# Patient Record
Sex: MALE | Race: WHITE | NOT HISPANIC OR LATINO | Employment: OTHER | URBAN - METROPOLITAN AREA
[De-identification: names, ages, dates, MRNs, and addresses within clinical notes are randomized per-mention and may not be internally consistent; named-entity substitution may affect disease eponyms.]

---

## 2017-03-07 ENCOUNTER — OFFICE VISIT (OUTPATIENT)
Dept: OPHTHALMOLOGY | Facility: CLINIC | Age: 77
End: 2017-03-07
Payer: MEDICARE

## 2017-03-07 DIAGNOSIS — D31.32 NEVUS, CHOROIDAL, LEFT: ICD-10-CM

## 2017-03-07 DIAGNOSIS — H43.813 POSTERIOR VITREOUS DETACHMENT, BILATERAL: ICD-10-CM

## 2017-03-07 DIAGNOSIS — H02.403 PTOSIS, BILATERAL: ICD-10-CM

## 2017-03-07 DIAGNOSIS — H35.363 MACULAR DRUSEN, BILATERAL: ICD-10-CM

## 2017-03-07 DIAGNOSIS — H52.4 PRESBYOPIA: ICD-10-CM

## 2017-03-07 DIAGNOSIS — H26.9 CATARACT: Primary | ICD-10-CM

## 2017-03-07 PROCEDURE — 92014 COMPRE OPH EXAM EST PT 1/>: CPT | Performed by: OPHTHALMOLOGY

## 2017-03-07 PROCEDURE — 92015 DETERMINE REFRACTIVE STATE: CPT | Mod: GY | Performed by: OPHTHALMOLOGY

## 2017-03-07 ASSESSMENT — REFRACTION_WEARINGRX
OD_ADD: +3.00
OD_CYLINDER: +2.00
OD_SPHERE: +0.50
OS_CYLINDER: +1.50
OS_SPHERE: +0.50
OS_ADD: +3.00
OS_AXIS: 010
SPECS_TYPE: TRIFOCAL
OD_AXIS: 160

## 2017-03-07 ASSESSMENT — REFRACTION_MANIFEST
OD_SPHERE: +0.25
OS_CYLINDER: +1.75
OS_ADD: +3.00
OS_SPHERE: +0.25
OD_AXIS: 160
OD_ADD: +3.00
OD_CYLINDER: +2.00
OS_AXIS: 015

## 2017-03-07 ASSESSMENT — CONF VISUAL FIELD
OD_NORMAL: 1
OS_NORMAL: 1

## 2017-03-07 ASSESSMENT — CUP TO DISC RATIO
OS_RATIO: 0.2
OD_RATIO: 0.2

## 2017-03-07 ASSESSMENT — TONOMETRY
OD_IOP_MMHG: 15
IOP_METHOD: TONOPEN
OS_IOP_MMHG: 15

## 2017-03-07 ASSESSMENT — VISUAL ACUITY
OD_CC: 20/30+3
OD_CC: J1
OS_CC: 20/25-2
METHOD: SNELLEN - LINEAR
OS_CC: J1

## 2017-03-07 ASSESSMENT — EXTERNAL EXAM - RIGHT EYE: OD_EXAM: PROLAPSED FAT PADS: UPPER, LOWER

## 2017-03-07 ASSESSMENT — SLIT LAMP EXAM - LIDS: COMMENTS: 1+ DERMATOCHALASIS - UPPER LID, 1+ SCLERAL SHOW, 2+ PTOSIS

## 2017-03-07 ASSESSMENT — EXTERNAL EXAM - LEFT EYE: OS_EXAM: PROLAPSED FAT PADS: UPPER, LOWER

## 2017-03-07 NOTE — MR AVS SNAPSHOT
"              After Visit Summary   3/7/2017    Duane A Otte    MRN: [id number]           Patient Information     Date Of Birth          1940        Visit Information        Provider Department      3/7/2017 10:00 AM Zhen Barreto MD [facility]        Today's Diagnoses     Macular drusen, bilateral    -  1    Posterior vitreous detachment, bilateral        Nevus, choroidal, left        Cataract, mild, ou        Ptosis, mild, ou        Presbyopia          Care Instructions    Take a multiple vitamin or \"eye vitamin\" daily. (AREDS 2)  Protect your eyes outdoors from ultraviolet rays with sunglasses and/or brimmed hat.  Have spinach (cooked or raw), colorful fruits, walnuts, hazelnuts, almonds in your diet.  Monitor the vision in each eye weekly - call if any sudden persistent changes.   Glasses Rx given -optional   Use artificial tears up to 4 times daily (Refresh Tears or Systane Ultra/Balance)   Call in November 2017 for an appointment in March 2018 for Complete exam.    Dr. Barreto [phone]        Follow-ups after your visit        Who to contact     If you have questions or need follow up information about today's clinic visit or your schedule please contact [facility] directly at [phone].  Normal or non-critical lab and imaging results will be communicated to you by iFulfillmenthart, letter or phone within 4 business days after the clinic has received the results. If you do not hear from us within 7 days, please contact the clinic through iFulfillmenthart or phone. If you have a critical or abnormal lab result, we will notify you by phone as soon as possible.  Submit refill requests through Waddle or call your pharmacy and they will forward the refill request to us. Please allow 3 business days for your refill to be completed.          Additional Information About Your Visit        Waddle Information     Waddle lets you send messages to your doctor, view your test results, renew " "your prescriptions, schedule appointments and more. To sign up, go to [email or web address] . Click on \"Log in\" on the left side of the screen, which will take you to the Welcome page. Then click on \"Sign up Now\" on the right side of the page.     You will be asked to enter the access code listed below, as well as some personal information. Please follow the directions to create your username and password.     Your access code is: [email or web address]  Expires: 2017 10:58 AM     Your access code will  in 90 days. If you need help or a new code, please call your [facility] clinic or [phone].        Care EveryWhere ID     This is your Care EveryWhere ID. This could be used by other organizations to access your [facility] medical records  [id number]         Blood Pressure from Last 3 Encounters:   13 127/81   12 135/84   12 114/80    Weight from Last 3 Encounters:   13 76.7 kg (169 lb)   12 75.8 kg (167 lb)   12 77 kg (169 lb 12.8 oz)              We Performed the Following     EYE EXAM (SIMPLE-NONBILLABLE)     REFRACTIVE STATUS        Primary Care Provider Office Phone # Fax #    Benjamin Ruiz [phone]       [facility] [address] [place] [address]        Thank you!     Thank you for choosing [facility]  for your care. Our goal is always to provide you with excellent care. Hearing back from our patients is one way we can continue to improve our services. Please take a few minutes to complete the written survey that you may receive in the mail after your visit with us. Thank you!             Your Updated Medication List - Protect others around you: Learn how to safely use, store and throw away your medicines at www.disposemymeds.org.          This list is accurate as of: 3/7/17 10:58 AM.  Always use your most recent med list.                   Brand Name Dispense Instructions for use    alendronate 70 MG tablet    " FOSAMAX    12 tablet    Take 1 tablet by mouth once a week.       CALCIUM 600 + D PO      2 daily       celeXA 10 MG tablet   Generic drug:  citalopram      Take 10 mg by mouth daily.       LAXATIVE PO      6 daily       LISINOPRIL PO          MORPHINE SULFATE PO      Take 30 mg by mouth 2 times daily       MULTIPLE VITAMIN PO      1 daily       NORCO  MG per tablet      Take 1 tablet by mouth. 6 tablets a day.       OMEGA 3 PO      daily       order for DME     1 Container        sildenafil 100 MG cap/tab    REVATIO/VIAGRA    6 tablet    Take 1 tablet by mouth daily as needed for erectile dysfunction.       STOOL SOFTENER 100 MG capsule   Generic drug:  docusate sodium      Take 8 capsules by mouth daily.       TEGRETOL PO      100mg 1 three times a day       triamcinolone 0.1 % cream    KENALOG    30 g    Apply sparingly -twice daily-topically ,as needed.       VITAMIN B 12 PO      1000 mg daily       VITAMIN B COMPLEX PO      1 daily       VITAMIN C PO      1 daily       vitamin D 2000 UNITS Caps      Take 1 capsule by mouth daily.       zolpidem 10 MG tablet    AMBIEN    30 tablet    Take 1 tablet by mouth nightly as needed for sleep.

## 2017-03-07 NOTE — PROGRESS NOTES
" Current Eye Medications:  Tears prn     Subjective:  Comprehensive eye exam.   Patient reports is seeing well, vision seems unchanged and states eyes seem otherwise fine.     Objective:  See Ophthalmology Exam.       Assessment:  Stable eye exam.      ICD-10-CM    1. Cataract, mild, ou H26.9 EYE EXAM (SIMPLE-NONBILLABLE)   2. Macular drusen, bilateral H35.363    3. Posterior vitreous detachment, bilateral H43.813    4. Nevus, choroidal, left D31.32    5. Ptosis, mild, ou H02.403    6. Presbyopia H52.4 REFRACTIVE STATUS        Plan: Take a multiple vitamin or \"eye vitamin\" daily. (AREDS 2)  Protect your eyes outdoors from ultraviolet rays with sunglasses and/or brimmed hat.  Have spinach (cooked or raw), colorful fruits, walnuts, hazelnuts, almonds in your diet.  Monitor the vision in each eye weekly - call if any sudden persistent changes.   Glasses Rx given -optional   Use artificial tears up to 4 times daily (Refresh Tears or Systane Ultra/Balance)   Call in November 2017 for an appointment in March 2018 for Complete exam.    Dr. Barreto [phone]           "

## 2017-03-07 NOTE — PATIENT INSTRUCTIONS
"Take a multiple vitamin or \"eye vitamin\" daily. (AREDS 2)  Protect your eyes outdoors from ultraviolet rays with sunglasses and/or brimmed hat.  Have spinach (cooked or raw), colorful fruits, walnuts, hazelnuts, almonds in your diet.  Monitor the vision in each eye weekly - call if any sudden persistent changes.   Glasses Rx given -optional   Use artificial tears up to 4 times daily (Refresh Tears or Systane Ultra/Balance)   Call in November 2017 for an appointment in March 2018 for Complete exam.    Dr. Barreto [phone]  "

## 2017-10-08 ENCOUNTER — HEALTH MAINTENANCE LETTER (OUTPATIENT)
Age: 77
End: 2017-10-08

## 2018-04-10 ENCOUNTER — OFFICE VISIT (OUTPATIENT)
Dept: OPHTHALMOLOGY | Facility: CLINIC | Age: 78
End: 2018-04-10
Payer: MEDICARE

## 2018-04-10 DIAGNOSIS — D31.32 NEVUS, CHOROIDAL, LEFT: ICD-10-CM

## 2018-04-10 DIAGNOSIS — H43.813 POSTERIOR VITREOUS DETACHMENT, BILATERAL: ICD-10-CM

## 2018-04-10 DIAGNOSIS — H52.4 PRESBYOPIA: ICD-10-CM

## 2018-04-10 DIAGNOSIS — H35.363 MACULAR DRUSEN, BILATERAL: ICD-10-CM

## 2018-04-10 DIAGNOSIS — H02.403 PTOSIS, BILATERAL: ICD-10-CM

## 2018-04-10 DIAGNOSIS — H25.813 COMBINED FORMS OF AGE-RELATED CATARACT OF BOTH EYES: Primary | ICD-10-CM

## 2018-04-10 PROCEDURE — 92014 COMPRE OPH EXAM EST PT 1/>: CPT | Performed by: OPHTHALMOLOGY

## 2018-04-10 PROCEDURE — 92015 DETERMINE REFRACTIVE STATE: CPT | Mod: GY | Performed by: OPHTHALMOLOGY

## 2018-04-10 ASSESSMENT — EXTERNAL EXAM - LEFT EYE: OS_EXAM: PROLAPSED FAT PADS: UPPER, LOWER

## 2018-04-10 ASSESSMENT — REFRACTION_MANIFEST
OD_SPHERE: +0.25
OS_SPHERE: +0.50
OD_AXIS: 162
OS_AXIS: 010
OD_CYLINDER: +2.00
OS_ADD: +3.00
OS_CYLINDER: +1.75
OD_ADD: +3.00

## 2018-04-10 ASSESSMENT — VISUAL ACUITY
OS_CC: 20/20
OD_CC: 20/25
OD_CC+: -2
CORRECTION_TYPE: GLASSES
METHOD: SNELLEN - LINEAR
OS_CC+: -3

## 2018-04-10 ASSESSMENT — REFRACTION_WEARINGRX
OD_CYLINDER: +1.75
SPECS_TYPE: TRIFOCAL
OS_AXIS: 010
OD_ADD: +3.00
OD_SPHERE: +0.50
OS_CYLINDER: +1.50
OS_SPHERE: +0.50
OD_AXIS: 160
OS_ADD: +3.00

## 2018-04-10 ASSESSMENT — CUP TO DISC RATIO
OS_RATIO: 0.2
OD_RATIO: 0.2

## 2018-04-10 ASSESSMENT — TONOMETRY
IOP_METHOD: APPLANATION
OS_IOP_MMHG: 16
OD_IOP_MMHG: 16

## 2018-04-10 ASSESSMENT — SLIT LAMP EXAM - LIDS: COMMENTS: 1+ DERMATOCHALASIS - UPPER LID, 1+ SCLERAL SHOW, 2+ PTOSIS

## 2018-04-10 ASSESSMENT — CONF VISUAL FIELD
OS_NORMAL: 1
OD_NORMAL: 1

## 2018-04-10 ASSESSMENT — EXTERNAL EXAM - RIGHT EYE: OD_EXAM: PROLAPSED FAT PADS: UPPER, LOWER

## 2018-04-10 NOTE — PATIENT INSTRUCTIONS
"Glasses Rx given - optional.  Use artificial tears up to 4 times daily both eyes. (Refresh Tears, Systane Ultra/Balance, or Theratears)   Take a multiple vitamin or \"eye vitamin\" daily. (AREDS 2)  Protect your eyes outdoors from ultraviolet rays with sunglasses and/or brimmed hat.  Have spinach (cooked or raw), colorful fruits, walnuts, hazelnuts, almonds in your diet.  Monitor the vision in each eye weekly - call if any sudden persistent changes.   Use Amsler grid as instructed.    Call in December 2018 for an appointment in April 2019 for Complete Exam.    Dr. Barreto [phone]  "

## 2018-04-10 NOTE — PROGRESS NOTES
" Current Eye Medications:  Artificial tears as needed both eyes     Subjective:  Here for complete today.  Taking the AREDS every day.  Eyesight has been steady. Does not have amsler at home.      Objective:  See Ophthalmology Exam.       Assessment:  Stable eye exam.      ICD-10-CM    1. 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 Nevus, choroidal, left D31.32    3. Macular drusen, bilateral H35.363    4. Ptosis, mild, ou H02.403    5. Posterior vitreous detachment, bilateral H43.813    6. Presbyopia H52.4 REFRACTIVE STATUS        Plan:  Glasses Rx given - optional.  Use artificial tears up to 4 times daily both eyes. (Refresh Tears, Systane Ultra/Balance, or Theratears)   Take a multiple vitamin or \"eye vitamin\" daily. (AREDS 2)  Protect your eyes outdoors from ultraviolet rays with sunglasses and/or brimmed hat.  Have spinach (cooked or raw), colorful fruits, walnuts, hazelnuts, almonds in your diet.  Monitor the vision in each eye weekly - call if any sudden persistent changes.   Use Amsler grid as instructed.    Call in December 2018 for an appointment in April 2019 for Complete Exam.    Dr. Barreto [phone]    "

## 2018-04-10 NOTE — LETTER
"    4/10/2018         RE: Duane A Otte  [address]  [facility] [address]        Dear Colleague,    Thank you for referring your patient, Duane A Otte, to the [facility]. Please see a copy of my visit note below.     Current Eye Medications:  Artificial tears as needed both eyes     Subjective:  Here for complete today.  Taking the AREDS every day.  Eyesight has been steady. Does not have amsler at home.      Objective:  See Ophthalmology Exam.       Assessment:  Stable eye exam.      ICD-10-CM    1. 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 Nevus, choroidal, left D31.32    3. Macular drusen, bilateral H35.363    4. Ptosis, mild, ou H02.403    5. Posterior vitreous detachment, bilateral H43.813    6. Presbyopia H52.4 REFRACTIVE STATUS        Plan:  Glasses Rx given - optional.  Use artificial tears up to 4 times daily both eyes. (Refresh Tears, Systane Ultra/Balance, or Theratears)   Take a multiple vitamin or \"eye vitamin\" daily. (AREDS 2)  Protect your eyes outdoors from ultraviolet rays with sunglasses and/or brimmed hat.  Have spinach (cooked or raw), colorful fruits, walnuts, hazelnuts, almonds in your diet.  Monitor the vision in each eye weekly - call if any sudden persistent changes.   Use Amsler grid as instructed.    Call in December 2018 for an appointment in April 2019 for Complete Exam.    Dr. Barreto [phone]      Again, thank you for allowing me to participate in the care of your patient.        Sincerely,        Zhen Barreto MD    "

## 2018-04-10 NOTE — MR AVS SNAPSHOT
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  Use artificial tears up to 4 times daily both eyes. (Refresh Tears, Systane Ultra/Balance, or Theratears)   Take a multiple vitamin or \"eye vitamin\" daily. (AREDS 2)  Protect your eyes outdoors from ultraviolet rays with sunglasses and/or brimmed hat.  Have spinach (cooked or raw), colorful fruits, walnuts, hazelnuts, almonds in your diet.  Monitor the vision in each eye weekly - call if any sudden persistent changes.   Use Amsler grid as instructed.    Call in December 2018 for an appointment in April 2019 for Complete Exam.    Dr. Barreto [phone]          Follow-ups after your visit        Who to contact     If you have questions or need follow up information about today's clinic visit or your schedule please contact [facility] directly at [phone].  Normal or non-critical lab and imaging results will be communicated to you by MyChart, letter or phone within 4 business days after the clinic has received the results. If you do not hear from us within 7 days, please contact the clinic through MyChart or phone. If you have a critical or abnormal lab result, we will notify you by phone as soon as possible.  Submit refill requests through Go Try It On or call your pharmacy and they will forward the refill request to us. Please allow 3 business days for your refill to be completed.          Additional Information About Your Visit      " "  MyChart Information     DeliveryCheetah lets you send messages to your doctor, view your test results, renew your prescriptions, schedule appointments and more. To sign up, go to [email or web address] . Click on \"Log in\" on the left side of the screen, which will take you to the Welcome page. Then click on \"Sign up Now\" on the right side of the page.     You will be asked to enter the access code listed below, as well as some personal information. Please follow the directions to create your username and password.     Your access code is: [email or web address]  Expires: 2018  2:23 PM     Your access code will  in 90 days. If you need help or a new code, please call your [facility] clinic or [phone].        Care EveryWhere ID     This is your Care EveryWhere ID. This could be used by other organizations to access your [facility] medical records  [id number]         Blood Pressure from Last 3 Encounters:   13 127/81   12 135/84   12 114/80    Weight from Last 3 Encounters:   13 76.7 kg (169 lb)   12 75.8 kg (167 lb)   12 77 kg (169 lb 12.8 oz)              We Performed the Following     EYE EXAM (SIMPLE-NONBILLABLE)     REFRACTIVE STATUS        Primary Care Provider Office Phone # Fax #    Benjamin Ruiz [phone]       [facility] [place] [address] [place] [address]        Equal Access to Services     GREGORY DEL VALLE AH: Hadii aad ku hadasho Soomaali, waaxda luqadaha, qaybta kaalmada adeegyacatrachito, pavan bose. So [facility] [phone].    ATENCIÓN: Si habla español, tiene a amaral disposición servicios gratuitos de asistencia lingüística. Llame al [phone].    We comply with applicable federal civil rights laws and [place] laws. We do not discriminate on the basis of race, color, national origin, age, disability, sex, sexual orientation, or gender identity.            Thank you!     Thank you for choosing [facility]  for your care. Our goal is always to provide you with excellent care. Hearing back from our patients is one way we can continue to improve our services. Please take a few minutes to complete the written survey that you may receive in the mail after your visit with us. Thank you!             Your Updated Medication List - Protect others around you: Learn how to safely use, store and throw away your medicines at www.disposemymeds.org.          This list is accurate as of 4/10/18  2:23 PM.  Always use your most recent med list.                   Brand Name Dispense Instructions for use Diagnosis    alendronate 70 MG tablet    FOSAMAX    12 tablet    Take 1 tablet by mouth once a week.    Osteopenia       CALCIUM 600 + D PO      2 daily        celeXA 10 MG tablet   Generic drug:  citalopram      Take 10 mg by mouth daily.        ICAPS AREDS 2 PO      Take 1 capsule by mouth 2 times daily        LAXATIVE PO      6 daily        LISINOPRIL PO           MORPHINE SULFATE PO      Take 30 mg by mouth 2 times daily        MULTIPLE VITAMIN PO      1 daily        NORCO  MG per tablet      Take 1 tablet by mouth. 6 tablets a day.        OMEGA 3 PO      daily        order for DME     1 Container     Anal or rectal pain       sildenafil 100 MG tablet    VIAGRA    6 tablet    Take 1 tablet by mouth daily as needed for erectile dysfunction.    ED (erectile dysfunction)       STOOL SOFTENER 100 MG capsule   Generic drug:  docusate sodium      Take 8 capsules by mouth daily.        TEGRETOL PO      100mg 1 three times a day        triamcinolone 0.1 % cream    KENALOG    30 g    Apply sparingly -twice daily-topically ,as needed.    Rash       VITAMIN B 12 PO      1000 mg daily        VITAMIN B COMPLEX PO      1 daily        VITAMIN C PO      1 daily        vitamin D 2000 units Caps      Take 1 capsule by mouth daily.        zolpidem 10 MG tablet    AMBIEN    30 tablet    Take 1 tablet by mouth nightly as needed for sleep.     Insomnia

## 2019-04-16 ENCOUNTER — OFFICE VISIT (OUTPATIENT)
Dept: OPHTHALMOLOGY | Facility: CLINIC | Age: 79
End: 2019-04-16
Payer: MEDICARE

## 2019-04-16 DIAGNOSIS — H02.403 PTOSIS, BILATERAL: ICD-10-CM

## 2019-04-16 DIAGNOSIS — H52.4 PRESBYOPIA: ICD-10-CM

## 2019-04-16 DIAGNOSIS — D31.32 NEVUS, CHOROIDAL, LEFT: ICD-10-CM

## 2019-04-16 DIAGNOSIS — H35.363 MACULAR DRUSEN, BILATERAL: ICD-10-CM

## 2019-04-16 DIAGNOSIS — H43.813 POSTERIOR VITREOUS DETACHMENT, BILATERAL: ICD-10-CM

## 2019-04-16 DIAGNOSIS — H25.813 COMBINED FORMS OF AGE-RELATED CATARACT OF BOTH EYES: Primary | ICD-10-CM

## 2019-04-16 PROCEDURE — 92134 CPTRZ OPH DX IMG PST SGM RTA: CPT | Performed by: OPHTHALMOLOGY

## 2019-04-16 PROCEDURE — 92014 COMPRE OPH EXAM EST PT 1/>: CPT | Performed by: OPHTHALMOLOGY

## 2019-04-16 PROCEDURE — 92015 DETERMINE REFRACTIVE STATE: CPT | Mod: GY | Performed by: OPHTHALMOLOGY

## 2019-04-16 ASSESSMENT — CONF VISUAL FIELD
METHOD: COUNTING FINGERS
OS_NORMAL: 1
OD_NORMAL: 1

## 2019-04-16 ASSESSMENT — REFRACTION_WEARINGRX
OD_CYLINDER: +1.75
OS_AXIS: 010
SPECS_TYPE: TRIFOCAL
OS_SPHERE: +0.50
OD_AXIS: 160
OS_CYLINDER: +1.50
OS_ADD: +3.00
OD_SPHERE: +0.50
OD_ADD: +3.00

## 2019-04-16 ASSESSMENT — REFRACTION_MANIFEST
OS_CYLINDER: +2.25
OD_AXIS: 170
OD_CYLINDER: +1.75
OS_SPHERE: +0.25
OS_ADD: +2.75
OS_AXIS: 010
OD_SPHERE: +0.25
OD_ADD: +2.75

## 2019-04-16 ASSESSMENT — TONOMETRY
OD_IOP_MMHG: 12
IOP_METHOD: APPLANATION
OS_IOP_MMHG: 15

## 2019-04-16 ASSESSMENT — VISUAL ACUITY
METHOD: SNELLEN - LINEAR
OD_CC: 20/40
CORRECTION_TYPE: GLASSES
OS_CC+: -1
OD_CC+: -1
OS_CC: 20/30

## 2019-04-16 ASSESSMENT — EXTERNAL EXAM - RIGHT EYE: OD_EXAM: PROLAPSED FAT PADS: UPPER, LOWER

## 2019-04-16 ASSESSMENT — SLIT LAMP EXAM - LIDS: COMMENTS: 1+ DERMATOCHALASIS - UPPER LID, 1+ SCLERAL SHOW, 2+ PTOSIS

## 2019-04-16 ASSESSMENT — EXTERNAL EXAM - LEFT EYE: OS_EXAM: PROLAPSED FAT PADS: UPPER, LOWER

## 2019-04-16 ASSESSMENT — CUP TO DISC RATIO
OD_RATIO: 0.2
OS_RATIO: 0.2

## 2019-04-16 NOTE — PROGRESS NOTES
" Current Eye Medications:  Artificial tears as needed less then once a week both eyes.     Subjective:  Complete eye exam. Vision is doing pretty well both eyes. No eye pain or discomfort in either eye.      Objective:  See Ophthalmology Exam.       Assessment:  Stable eye exam.  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      ICD-10-CM    1. Combined forms of age-related cataract, mild, both eyes H25.813 EYE EXAM (SIMPLE-NONBILLABLE)   2. Macular drusen, bilateral H35.363 HC COMPUTERIZED OPHTHALMIC IMAGING RETINA   3. Nevus, choroidal, left D31.32    4. Ptosis, mild, ou H02.403    5. Posterior vitreous detachment, bilateral H43.813    6. Presbyopia H52.4 REFRACTION        Plan:  Glasses Rx given - optional.  Use artificial tears up to 4 times daily both eyes.  (Refresh Tears, Systane Ultra/Balance, or Theratears)  Take a multiple vitamin or \"eye vitamin\" daily (AREDS2).  Protect your eyes outdoors from ultraviolet rays with sunglasses and/or brimmed hat.  Have spinach (cooked or raw), colorful fruits, walnuts, hazelnuts, almonds in your diet.  Monitor the vision in each eye weekly - call if any sudden persistent changes.  Obtain baseline OCT today - will call with any concerns.   Call in December 2019 for an appointment in April 2020 for Complete Exam.    Dr. Barreto [phone]           "

## 2019-04-16 NOTE — PATIENT INSTRUCTIONS
"Glasses Rx given - optional.  Use artificial tears up to 4 times daily both eyes.  (Refresh Tears, Systane Ultra/Balance, or Theratears)  Take a multiple vitamin or \"eye vitamin\" daily (AREDS2).  Protect your eyes outdoors from ultraviolet rays with sunglasses and/or brimmed hat.  Have spinach (cooked or raw), colorful fruits, walnuts, hazelnuts, almonds in your diet.  Monitor the vision in each eye weekly - call if any sudden persistent changes.  Obtain baseline OCT today - will call with any concerns.   Call in December 2019 for an appointment in April 2020 for Complete Exam.    Dr. Barreto [phone]    "

## 2019-04-16 NOTE — LETTER
"    4/16/2019         RE: Duane A Otte  [address] [facility] [address]        Dear Colleague,    Thank you for referring your patient, Duane A Otte, to the [facility]. Please see a copy of my visit note below.     Current Eye Medications:  Artificial tears as needed less then once a week both eyes.     Subjective:  Complete eye exam. Vision is doing pretty well both eyes. No eye pain or discomfort in either eye.      Objective:  See Ophthalmology Exam.       Assessment:  Stable eye exam.  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      ICD-10-CM    1. Combined forms of age-related cataract, mild, both eyes H25.813 EYE EXAM (SIMPLE-NONBILLABLE)   2. Macular drusen, bilateral H35.363 HC COMPUTERIZED OPHTHALMIC IMAGING RETINA   3. Nevus, choroidal, left D31.32    4. Ptosis, mild, ou H02.403    5. Posterior vitreous detachment, bilateral H43.813    6. Presbyopia H52.4 REFRACTION        Plan:  Glasses Rx given - optional.  Use artificial tears up to 4 times daily both eyes.  (Refresh Tears, Systane Ultra/Balance, or Theratears)  Take a multiple vitamin or \"eye vitamin\" daily (AREDS2).  Protect your eyes outdoors from ultraviolet rays with sunglasses and/or brimmed hat.  Have spinach (cooked or raw), colorful fruits, walnuts, hazelnuts, almonds in your diet.  Monitor the vision in each eye weekly - call if any sudden persistent changes.  Obtain baseline OCT today - will call with any concerns.   Call in December 2019 for an appointment in April 2020 for Complete Exam.    Dr. Barreto [phone]             Again, thank you for allowing me to participate in the care of your patient.        Sincerely,        Zhen Barreto MD    "

## 2019-09-16 ENCOUNTER — APPOINTMENT (OUTPATIENT)
Age: 79
Setting detail: DERMATOLOGY
End: 2019-09-18

## 2019-09-16 VITALS — HEIGHT: 70 IN | RESPIRATION RATE: 14 BRPM | WEIGHT: 172 LBS

## 2019-09-16 DIAGNOSIS — L57.0 ACTINIC KERATOSIS: ICD-10-CM

## 2019-09-16 DIAGNOSIS — L82.1 OTHER SEBORRHEIC KERATOSIS: ICD-10-CM

## 2019-09-16 PROCEDURE — OTHER PRESCRIPTION SAMPLES PROVIDED: OTHER

## 2019-09-16 PROCEDURE — OTHER LIQUID NITROGEN: OTHER

## 2019-09-16 PROCEDURE — 17000 DESTRUCT PREMALG LESION: CPT

## 2019-09-16 PROCEDURE — 17003 DESTRUCT PREMALG LES 2-14: CPT

## 2019-09-16 PROCEDURE — OTHER COUNSELING: OTHER

## 2019-09-16 PROCEDURE — 99202 OFFICE O/P NEW SF 15 MIN: CPT | Mod: 25

## 2019-09-16 ASSESSMENT — LOCATION ZONE DERM
LOCATION ZONE: EAR
LOCATION ZONE: ARM
LOCATION ZONE: TRUNK
LOCATION ZONE: FACE

## 2019-09-16 ASSESSMENT — LOCATION SIMPLE DESCRIPTION DERM
LOCATION SIMPLE: LEFT FOREARM
LOCATION SIMPLE: RIGHT FOREHEAD
LOCATION SIMPLE: CHEST
LOCATION SIMPLE: LEFT EAR
LOCATION SIMPLE: LEFT FOREHEAD

## 2019-09-16 ASSESSMENT — LOCATION DETAILED DESCRIPTION DERM
LOCATION DETAILED: LEFT SUPERIOR FOREHEAD
LOCATION DETAILED: LEFT SUPERIOR CRUS OF ANTIHELIX
LOCATION DETAILED: LEFT SUPERIOR HELIX
LOCATION DETAILED: LEFT DISTAL DORSAL FOREARM
LOCATION DETAILED: RIGHT SUPERIOR FOREHEAD
LOCATION DETAILED: RIGHT MEDIAL SUPERIOR CHEST

## 2019-09-16 NOTE — PROCEDURE: LIQUID NITROGEN
Render Note In Bullet Format When Appropriate: No
Render Post-Care Instructions In Note?: yes
Post-Care Instructions: Patient was instructed to avoid picking at any of the treated lesions. Should any lesions treated today not be resolved within 4 weeks or if not certain, then return to clinic for re-evaluation.
Duration Of Freeze Thaw-Cycle (Seconds): 0
Consent: - Discussed that these are a result of cumulative sun exposure.\\n- Verbal and written consent was obtained, and risks were reviewed prior to procedure today. \\n- Risks discussed include but are not limited to pain, crusting, scabbing, blistering, scarring, temporary or permanent darker or lighter pigmentary change, recurrence, incomplete resolution, and infection.
Detail Level: Detailed

## 2019-10-29 ENCOUNTER — APPOINTMENT (OUTPATIENT)
Age: 79
Setting detail: DERMATOLOGY
End: 2019-10-29

## 2019-10-29 VITALS — WEIGHT: 171 LBS | RESPIRATION RATE: 16 BRPM | HEIGHT: 70 IN

## 2019-10-29 DIAGNOSIS — L57.0 ACTINIC KERATOSIS: ICD-10-CM

## 2019-10-29 DIAGNOSIS — L82.1 OTHER SEBORRHEIC KERATOSIS: ICD-10-CM

## 2019-10-29 PROCEDURE — 99213 OFFICE O/P EST LOW 20 MIN: CPT | Mod: 25

## 2019-10-29 PROCEDURE — OTHER COUNSELING: OTHER

## 2019-10-29 PROCEDURE — OTHER LIQUID NITROGEN: OTHER

## 2019-10-29 PROCEDURE — 17000 DESTRUCT PREMALG LESION: CPT

## 2019-10-29 PROCEDURE — 17003 DESTRUCT PREMALG LES 2-14: CPT

## 2019-10-29 ASSESSMENT — LOCATION SIMPLE DESCRIPTION DERM
LOCATION SIMPLE: LEFT TEMPLE
LOCATION SIMPLE: RIGHT FOREARM
LOCATION SIMPLE: LEFT FOREHEAD
LOCATION SIMPLE: RIGHT UPPER ARM
LOCATION SIMPLE: CHEST

## 2019-10-29 ASSESSMENT — LOCATION ZONE DERM
LOCATION ZONE: FACE
LOCATION ZONE: TRUNK
LOCATION ZONE: ARM

## 2019-10-29 ASSESSMENT — LOCATION DETAILED DESCRIPTION DERM
LOCATION DETAILED: RIGHT PROXIMAL LATERAL POSTERIOR UPPER ARM
LOCATION DETAILED: LEFT CENTRAL TEMPLE
LOCATION DETAILED: LEFT SUPERIOR FOREHEAD
LOCATION DETAILED: RIGHT DISTAL LATERAL POSTERIOR UPPER ARM
LOCATION DETAILED: LEFT MEDIAL SUPERIOR CHEST
LOCATION DETAILED: RIGHT VENTRAL PROXIMAL FOREARM
LOCATION DETAILED: RIGHT PROXIMAL POSTERIOR UPPER ARM

## 2019-10-29 NOTE — PROCEDURE: LIQUID NITROGEN
Render Note In Bullet Format When Appropriate: No
Post-Care Instructions: I reviewed with the patient in detail post-care instructions. Patient is to wear 30 spf sun protection, and avoid picking at any of the treated lesions. Pt may apply Petrolatum to crusted or scabbing areas.
Detail Level: Detailed
Consent: The patient's consent was obtained including but not limited to risks of crusting, scabbing, blistering, scarring, darker or lighter pigmentary change, recurrence, incomplete removal and infection.
Duration Of Freeze Thaw-Cycle (Seconds): 0

## 2020-12-16 ENCOUNTER — OFFICE VISIT (OUTPATIENT)
Dept: OPHTHALMOLOGY | Facility: CLINIC | Age: 80
End: 2020-12-16
Payer: MEDICARE

## 2020-12-16 DIAGNOSIS — H35.3122 INTERMEDIATE STAGE NONEXUDATIVE AGE-RELATED MACULAR DEGENERATION OF LEFT EYE: ICD-10-CM

## 2020-12-16 DIAGNOSIS — H35.3211 EXUDATIVE AGE-RELATED MACULAR DEGENERATION OF RIGHT EYE WITH ACTIVE CHOROIDAL NEOVASCULARIZATION (H): Primary | ICD-10-CM

## 2020-12-16 PROCEDURE — 92134 CPTRZ OPH DX IMG PST SGM RTA: CPT | Performed by: OPHTHALMOLOGY

## 2020-12-16 PROCEDURE — 92012 INTRM OPH EXAM EST PATIENT: CPT | Performed by: OPHTHALMOLOGY

## 2020-12-16 ASSESSMENT — CONF VISUAL FIELD
METHOD: COUNTING FINGERS
OD_NORMAL: 1
OS_NORMAL: 1

## 2020-12-16 ASSESSMENT — REFRACTION_WEARINGRX
OD_SPHERE: +0.50
OS_ADD: +3.00
OS_SPHERE: +0.50
OD_CYLINDER: +1.75
OD_AXIS: 160
SPECS_TYPE: TRIFOCAL
OS_AXIS: 010
OD_ADD: +3.00
OS_CYLINDER: +1.50

## 2020-12-16 ASSESSMENT — VISUAL ACUITY
OS_PH_CC+: -2
OS_CC+: -1
OD_CC+: -1
METHOD: SNELLEN - LINEAR
OD_CC: 20/125
OS_CC: 20/50
CORRECTION_TYPE: GLASSES
OS_PH_CC: 20/30

## 2020-12-16 ASSESSMENT — TONOMETRY
OD_IOP_MMHG: 16
IOP_METHOD: APPLANATION
OS_IOP_MMHG: 17

## 2020-12-16 NOTE — PROGRESS NOTES
Current Eye Medications:  Areds 2 twice a day. Artificial tears as needed both eyes, hasn't used for a few weeks     Subjective:  Black spot in center of vision right eye for last 1-2 weeks. Vision is blurry right eye, fine left eye. Glasses are 3 years old, didn't fill last Rx. The other day right eye felt dry. Not having pain either eye. Used to have little floaters, but doesn't see any of them.      Objective:  See Ophthalmology Exam.       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      Plan:  Will schedule appointment with retina specialist for evaluation and probable treatment right eye.  Zhen Barreto M.D.  [phone]

## 2020-12-16 NOTE — LETTER
12/16/2020         RE: Duane A Otte  [address] [facility]  [place] [address]        Dear Colleague,    Thank you for referring your patient, Duane A Otte, to the [facility]. Please see a copy of my visit note below.     Current Eye Medications:  Areds 2 twice a day. Artificial tears as needed both eyes, hasn't used for a few weeks     Subjective:  Black spot in center of vision right eye for last 1-2 weeks. Vision is blurry right eye, fine left eye. Glasses are 3 years old, didn't fill last Rx. The other day right eye felt dry. Not having pain either eye. Used to have little floaters, but doesn't see any of them.      Objective:  See Ophthalmology Exam.       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      Plan:  Will schedule appointment with retina specialist for evaluation and probable treatment right eye.  Zhen Barreto M.D.  [phone]             Again, thank you for allowing me to participate in the care of your patient.        Sincerely,        Zhen Barreto MD

## 2020-12-17 PROBLEM — H35.3122 INTERMEDIATE STAGE NONEXUDATIVE AGE-RELATED MACULAR DEGENERATION OF LEFT EYE: Status: ACTIVE | Noted: 2020-12-17

## 2020-12-17 PROBLEM — H35.3211 EXUDATIVE AGE-RELATED MACULAR DEGENERATION OF RIGHT EYE WITH ACTIVE CHOROIDAL NEOVASCULARIZATION (H): Status: ACTIVE | Noted: 2020-12-17

## 2020-12-17 ASSESSMENT — EXTERNAL EXAM - LEFT EYE: OS_EXAM: PROLAPSED FAT PADS: UPPER, LOWER

## 2020-12-17 ASSESSMENT — EXTERNAL EXAM - RIGHT EYE: OD_EXAM: PROLAPSED FAT PADS: UPPER, LOWER

## 2020-12-17 ASSESSMENT — CUP TO DISC RATIO: OD_RATIO: 0.2

## 2020-12-17 ASSESSMENT — SLIT LAMP EXAM - LIDS: COMMENTS: 1+ DERMATOCHALASIS - UPPER LID, 1+ SCLERAL SHOW, 2+ PTOSIS

## 2020-12-18 ENCOUNTER — TELEPHONE (OUTPATIENT)
Dept: OPHTHALMOLOGY | Facility: CLINIC | Age: 80
End: 2020-12-18

## 2020-12-18 NOTE — TELEPHONE ENCOUNTER
[facility] called wanting the last visit notes for this patient that Dr. Barreto referred. The fax number they need it sent to is: [phone]. Any questions please call: [phone].

## 2020-12-18 NOTE — PATIENT INSTRUCTIONS
Will schedule appointment with retina specialist for evaluation and probable treatment right eye.  Zhen Barreto M.D.  [phone]

## 2020-12-24 ENCOUNTER — DOCUMENTATION ONLY (OUTPATIENT)
Dept: OPHTHALMOLOGY | Facility: CLINIC | Age: 80
End: 2020-12-24

## 2021-04-17 ENCOUNTER — HEALTH MAINTENANCE LETTER (OUTPATIENT)
Age: 81
End: 2021-04-17

## 2021-04-21 ENCOUNTER — OFFICE VISIT (OUTPATIENT)
Dept: OPHTHALMOLOGY | Facility: CLINIC | Age: 81
End: 2021-04-21
Payer: MEDICARE

## 2021-04-21 DIAGNOSIS — H25.813 COMBINED FORMS OF AGE-RELATED CATARACT OF BOTH EYES: Primary | ICD-10-CM

## 2021-04-21 DIAGNOSIS — D31.32 NEVUS, CHOROIDAL, LEFT: ICD-10-CM

## 2021-04-21 DIAGNOSIS — Z01.01 ENCOUNTER FOR EXAMINATION OF EYES AND VISION WITH ABNORMAL FINDINGS: ICD-10-CM

## 2021-04-21 DIAGNOSIS — H02.403 PTOSIS, BILATERAL: ICD-10-CM

## 2021-04-21 DIAGNOSIS — H35.3211 EXUDATIVE AGE-RELATED MACULAR DEGENERATION OF RIGHT EYE WITH ACTIVE CHOROIDAL NEOVASCULARIZATION (H): ICD-10-CM

## 2021-04-21 DIAGNOSIS — H52.4 PRESBYOPIA: ICD-10-CM

## 2021-04-21 DIAGNOSIS — H35.3122 INTERMEDIATE STAGE NONEXUDATIVE AGE-RELATED MACULAR DEGENERATION OF LEFT EYE: ICD-10-CM

## 2021-04-21 PROCEDURE — 92134 CPTRZ OPH DX IMG PST SGM RTA: CPT | Performed by: OPHTHALMOLOGY

## 2021-04-21 PROCEDURE — 92014 COMPRE OPH EXAM EST PT 1/>: CPT | Performed by: OPHTHALMOLOGY

## 2021-04-21 PROCEDURE — 92015 DETERMINE REFRACTIVE STATE: CPT | Mod: GY | Performed by: OPHTHALMOLOGY

## 2021-04-21 RX ORDER — CARBOXYMETHYLCELLULOSE SODIUM 5 MG/ML
1 SOLUTION/ DROPS OPHTHALMIC 2 TIMES DAILY
COMMUNITY

## 2021-04-21 ASSESSMENT — CONF VISUAL FIELD
OD_INFERIOR_TEMPORAL_RESTRICTION: 2
OS_NORMAL: 1
OD_INFERIOR_NASAL_RESTRICTION: 2
OD_SUPERIOR_TEMPORAL_RESTRICTION: 2
OD_SUPERIOR_NASAL_RESTRICTION: 2

## 2021-04-21 ASSESSMENT — TONOMETRY
OD_IOP_MMHG: 14
IOP_METHOD: APPLANATION
OS_IOP_MMHG: 14

## 2021-04-21 ASSESSMENT — CUP TO DISC RATIO
OS_RATIO: 0.2
OD_RATIO: 0.2

## 2021-04-21 ASSESSMENT — REFRACTION_MANIFEST
OD_SPHERE: +1.00
OS_CYLINDER: +1.50
OS_SPHERE: PLANO
OS_AXIS: 017
OD_CYLINDER: +1.50
OS_ADD: +3.00
OD_ADD: +3.00
OD_AXIS: 180

## 2021-04-21 ASSESSMENT — REFRACTION_WEARINGRX
OD_CYLINDER: +1.75
OS_AXIS: 010
OD_AXIS: 160
OS_ADD: +3.00
OS_CYLINDER: +1.50
OD_ADD: +3.00
OS_SPHERE: +0.50
OD_SPHERE: +0.50
SPECS_TYPE: TRIFOCAL

## 2021-04-21 ASSESSMENT — SLIT LAMP EXAM - LIDS: COMMENTS: 1+ DERMATOCHALASIS - UPPER LID, 1+ SCLERAL SHOW, 2+ PTOSIS

## 2021-04-21 ASSESSMENT — EXTERNAL EXAM - LEFT EYE: OS_EXAM: PROLAPSED FAT PADS: UPPER, LOWER

## 2021-04-21 ASSESSMENT — VISUAL ACUITY
CORRECTION_TYPE: GLASSES
METHOD: SNELLEN - LINEAR
OD_CC: 20/80
OS_CC: 20/30
OD_CC+: -1+1
OS_CC+: -1

## 2021-04-21 ASSESSMENT — EXTERNAL EXAM - RIGHT EYE: OD_EXAM: PROLAPSED FAT PADS: UPPER, LOWER

## 2021-04-21 NOTE — PROGRESS NOTES
" Current Eye Medications:  Areds 2 twice a day,  Artificial tears BID both eyes     Subjective: here for complete eye exam today. Right eye is a little better, but the OCT peaked and won't go down. Has had around five shots in the right eye. Center is still blurred. Left eye hasn't been changed in about two years.     Injections right eye every 4 wks with Dr. Lakhani at [facility].     Objective:  See Ophthalmology Exam.       Assessment:  Stable eye exam in patient with active antiVEGF right eye.  Cataracts not visually significant.  Retinal edema right eye improved on retinal OCT.      ICD-10-CM    1. Combined forms of age-related cataract, mild, both eyes  H25.813    2. Exudative age-related macular degeneration of right eye with active choroidal neovascularization (H)  H35.3211 EYE EXAM (SIMPLE-NONBILLABLE)      COMPUTERIZED OPHTHALMIC IMAGING RETINA   3. Intermediate stage nonexudative age-related macular degeneration of left eye  H35.3122    4. Ptosis, mild, ou  H02.403    5. Nevus, choroidal, left  D31.32    6. Encounter for examination of eyes and vision with abnormal findings  Z01.01    7. Presbyopia  H52.4 REFRACTIVE STATUS        Plan:  Glasses Rx given - optional  Take a multiple vitamin or \"eye vitamin\" daily (AREDS2).  Protect your eyes outdoors from ultraviolet rays with sunglasses and/or brimmed hat.  Have spinach (cooked or raw), colorful fruits, walnuts, hazelnuts, almonds in your diet.  Monitor the vision in each eye weekly - call if any sudden persistent changes.  Use artificial tears up to 4 times daily both eyes as needed (Refresh Tears, Systane Ultra/Balance, or Theratears)   Continue care with Dr. Lakhani.  Call in December 2021 for an appointment in April 2022 for Complete Exam    Dr. Barreto [phone]           "

## 2021-04-21 NOTE — LETTER
"    4/21/2021         RE: Duane A Otte  [address] [address] [address]        Dear Colleague,    Thank you for referring your patient, Duane A Otte, to the [facility]. Please see a copy of my visit note below.     Current Eye Medications:  Areds 2 twice a day,  Artificial tears BID both eyes     Subjective: here for complete eye exam today. Right eye is a little better, but the OCT peaked and won't go down. Has had around five shots in the right eye. Center is still blurred. Left eye hasn't been changed in about two years.     Injections right eye every 4 wks with Dr. Lakhani at [facility].     Objective:  See Ophthalmology Exam.       Assessment:  Stable eye exam in patient with active antiVEGF right eye.  Cataracts not visually significant.  Retinal edema right eye improved on retinal OCT.      ICD-10-CM    1. Combined forms of age-related cataract, mild, both eyes  H25.813    2. Exudative age-related macular degeneration of right eye with active choroidal neovascularization (H)  H35.3211 EYE EXAM (SIMPLE-NONBILLABLE)      COMPUTERIZED OPHTHALMIC IMAGING RETINA   3. Intermediate stage nonexudative age-related macular degeneration of left eye  H35.3122    4. Ptosis, mild, ou  H02.403    5. Nevus, choroidal, left  D31.32    6. Encounter for examination of eyes and vision with abnormal findings  Z01.01    7. Presbyopia  H52.4 REFRACTIVE STATUS        Plan:  Glasses Rx given - optional  Take a multiple vitamin or \"eye vitamin\" daily (AREDS2).  Protect your eyes outdoors from ultraviolet rays with sunglasses and/or brimmed hat.  Have spinach (cooked or raw), colorful fruits, walnuts, hazelnuts, almonds in your diet.  Monitor the vision in each eye weekly - call if any sudden persistent changes.  Use artificial tears up to 4 times daily both eyes as needed (Refresh Tears, Systane Ultra/Balance, or Theratears)   Continue care with Dr. Lakhani.  Call in December 2021 for an appointment in April 2022 for " Complete Exam    Dr. Barreto [phone]               Again, thank you for allowing me to participate in the care of your patient.        Sincerely,        Zhen Barreto MD

## 2021-04-21 NOTE — PATIENT INSTRUCTIONS
"Glasses Rx given - optional  Take a multiple vitamin or \"eye vitamin\" daily (AREDS2).  Protect your eyes outdoors from ultraviolet rays with sunglasses and/or brimmed hat.  Have spinach (cooked or raw), colorful fruits, walnuts, hazelnuts, almonds in your diet.  Monitor the vision in each eye weekly - call if any sudden persistent changes.  Use artificial tears up to 4 times daily both eyes as needed (Refresh Tears, Systane Ultra/Balance, or Theratears)   Continue care with Dr. Lakhani.  Call in December 2021 for an appointment in April 2022 for Complete Exam    Dr. Barreto [phone]                 "

## 2021-04-26 ENCOUNTER — DOCUMENTATION ONLY (OUTPATIENT)
Dept: OPHTHALMOLOGY | Facility: CLINIC | Age: 81
End: 2021-04-26

## 2021-09-26 ENCOUNTER — HEALTH MAINTENANCE LETTER (OUTPATIENT)
Age: 81
End: 2021-09-26

## 2022-04-22 ENCOUNTER — OFFICE VISIT (OUTPATIENT)
Dept: OPHTHALMOLOGY | Facility: CLINIC | Age: 82
End: 2022-04-22
Payer: MEDICARE

## 2022-04-22 DIAGNOSIS — H02.403 PTOSIS, BILATERAL: ICD-10-CM

## 2022-04-22 DIAGNOSIS — H25.813 COMBINED FORMS OF AGE-RELATED CATARACT OF BOTH EYES: Primary | ICD-10-CM

## 2022-04-22 DIAGNOSIS — D31.32 NEVUS, CHOROIDAL, LEFT: ICD-10-CM

## 2022-04-22 DIAGNOSIS — H35.3122 INTERMEDIATE STAGE NONEXUDATIVE AGE-RELATED MACULAR DEGENERATION OF LEFT EYE: ICD-10-CM

## 2022-04-22 DIAGNOSIS — H35.3211 EXUDATIVE AGE-RELATED MACULAR DEGENERATION OF RIGHT EYE WITH ACTIVE CHOROIDAL NEOVASCULARIZATION (H): ICD-10-CM

## 2022-04-22 DIAGNOSIS — Z01.01 ENCOUNTER FOR EXAMINATION OF EYES AND VISION WITH ABNORMAL FINDINGS: ICD-10-CM

## 2022-04-22 DIAGNOSIS — H52.4 PRESBYOPIA: ICD-10-CM

## 2022-04-22 DIAGNOSIS — H43.813 POSTERIOR VITREOUS DETACHMENT, BILATERAL: ICD-10-CM

## 2022-04-22 PROCEDURE — 92015 DETERMINE REFRACTIVE STATE: CPT | Mod: GY | Performed by: OPHTHALMOLOGY

## 2022-04-22 PROCEDURE — 92014 COMPRE OPH EXAM EST PT 1/>: CPT | Performed by: OPHTHALMOLOGY

## 2022-04-22 ASSESSMENT — SLIT LAMP EXAM - LIDS: COMMENTS: 1+ DERMATOCHALASIS - UPPER LID, 1+ SCLERAL SHOW, 2+ PTOSIS

## 2022-04-22 ASSESSMENT — EXTERNAL EXAM - RIGHT EYE: OD_EXAM: PROLAPSED FAT PADS: UPPER, LOWER

## 2022-04-22 ASSESSMENT — REFRACTION_WEARINGRX
OS_ADD: +3.00
OS_CYLINDER: +1.50
SPECS_TYPE: TRIFOCAL
OS_SPHERE: +0.50
OD_ADD: +3.00
OS_AXIS: 010
OD_CYLINDER: +1.75
OD_AXIS: 160
OD_SPHERE: +0.50

## 2022-04-22 ASSESSMENT — VISUAL ACUITY
OS_PH_CC: 20/40
CORRECTION_TYPE: GLASSES
METHOD: SNELLEN - LINEAR
OS_CC+: -1
OS_CC: 20/50
OD_CC: 20/60
OS_PH_CC+: -1

## 2022-04-22 ASSESSMENT — REFRACTION_MANIFEST
OD_ADD: +3.25
OS_ADD: +3.25
OS_CYLINDER: +1.75
OD_AXIS: 160
OS_AXIS: 020
OD_CYLINDER: +1.75
OS_SPHERE: PLANO
OD_SPHERE: +0.50

## 2022-04-22 ASSESSMENT — CONF VISUAL FIELD
OS_NORMAL: 1
OD_INFERIOR_NASAL_RESTRICTION: 2
OD_INFERIOR_TEMPORAL_RESTRICTION: 2
OD_SUPERIOR_NASAL_RESTRICTION: 2
OD_SUPERIOR_TEMPORAL_RESTRICTION: 2

## 2022-04-22 ASSESSMENT — EXTERNAL EXAM - LEFT EYE: OS_EXAM: PROLAPSED FAT PADS: UPPER, LOWER

## 2022-04-22 ASSESSMENT — TONOMETRY
OS_IOP_MMHG: 16
IOP_METHOD: APPLANATION
OD_IOP_MMHG: 15

## 2022-04-22 ASSESSMENT — CUP TO DISC RATIO
OS_RATIO: 0.2
OD_RATIO: 0.2

## 2022-04-22 NOTE — LETTER
"    4/22/2022         RE: Duane A Otte  [address] [address] [address]        Dear Colleague,    Thank you for referring your patient, Duane A Otte, to the [facility]. Please see a copy of my visit note below.     Current Eye Medications:  Refresh BID both eyes. AREDS BID daily       Subjective:  Here for complete eye exam today. Getting shots in the right eye on Monday 4-25-22 with Dr. Lakhani.      Objective:  See Ophthalmology Exam.       Assessment:  Stable eye exam in patient with active antiVEGF treatment right eye.      ICD-10-CM    1. Combined forms of age-related cataract, mild, both eyes  H25.813 EYE EXAM (SIMPLE-NONBILLABLE)   2. Exudative age-related macular degeneration of right eye with active choroidal neovascularization (H)  H35.3211    3. Intermediate stage nonexudative age-related macular degeneration of left eye  H35.3122 EYE EXAM (SIMPLE-NONBILLABLE)   4. Nevus, choroidal, left  D31.32    5. Ptosis, mild, ou  H02.403    6. Posterior vitreous detachment, bilateral  H43.813    7. Encounter for examination of eyes and vision with abnormal findings  Z01.01    8. Presbyopia  H52.4 REFRACTIVE STATUS        Plan:  May use artificial tears up to 4 times daily both eyes. (Refresh Tears, Systane Ultra/Balance, or Theratears)   Continue care with Dr. Lakhani.  Take a multiple vitamin or \"eye vitamin\" daily (AREDS2).  Protect your eyes outdoors from ultraviolet rays with sunglasses and/or brimmed hat.  Have spinach (cooked or raw), colorful fruits, walnuts, hazelnuts, almonds in your diet.  Monitor the vision in each eye weekly - call if any sudden persistent changes.   Call in December 2022 for an appointment in April 2023 for Complete Exam    Dr. Barreto [phone]          Again, thank you for allowing me to participate in the care of your patient.        Sincerely,        Zhen Barreto MD    "

## 2022-04-22 NOTE — PATIENT INSTRUCTIONS
"May use artificial tears up to 4 times daily both eyes. (Refresh Tears, Systane Ultra/Balance, or Theratears)   Continue care with Dr. Lakhani.  Take a multiple vitamin or \"eye vitamin\" daily (AREDS2).  Protect your eyes outdoors from ultraviolet rays with sunglasses and/or brimmed hat.  Have spinach (cooked or raw), colorful fruits, walnuts, hazelnuts, almonds in your diet.  Monitor the vision in each eye weekly - call if any sudden persistent changes.   Call in December 2022 for an appointment in April 2023 for Complete Exam    Dr. Barreto [phone]   "

## 2022-04-22 NOTE — PROGRESS NOTES
" Current Eye Medications:  Refresh BID both eyes. AREDS BID daily       Subjective:  Here for complete eye exam today. Getting shots in the right eye on Monday 4-25-22 with Dr. Lakhani.      Objective:  See Ophthalmology Exam.       Assessment:  Stable eye exam in patient with active antiVEGF treatment right eye.      ICD-10-CM    1. Combined forms of age-related cataract, mild, both eyes  H25.813 EYE EXAM (SIMPLE-NONBILLABLE)   2. Exudative age-related macular degeneration of right eye with active choroidal neovascularization (H)  H35.3211    3. Intermediate stage nonexudative age-related macular degeneration of left eye  H35.3122 EYE EXAM (SIMPLE-NONBILLABLE)   4. Nevus, choroidal, left  D31.32    5. Ptosis, mild, ou  H02.403    6. Posterior vitreous detachment, bilateral  H43.813    7. Encounter for examination of eyes and vision with abnormal findings  Z01.01    8. Presbyopia  H52.4 REFRACTIVE STATUS        Plan:  May use artificial tears up to 4 times daily both eyes. (Refresh Tears, Systane Ultra/Balance, or Theratears)   Continue care with Dr. Lakhani.  Take a multiple vitamin or \"eye vitamin\" daily (AREDS2).  Protect your eyes outdoors from ultraviolet rays with sunglasses and/or brimmed hat.  Have spinach (cooked or raw), colorful fruits, walnuts, hazelnuts, almonds in your diet.  Monitor the vision in each eye weekly - call if any sudden persistent changes.   Call in December 2022 for an appointment in April 2023 for Complete Exam    Dr. Barreto [phone]      "

## 2022-05-08 ENCOUNTER — HEALTH MAINTENANCE LETTER (OUTPATIENT)
Age: 82
End: 2022-05-08

## 2023-04-23 ENCOUNTER — HEALTH MAINTENANCE LETTER (OUTPATIENT)
Age: 83
End: 2023-04-23

## 2023-04-24 ENCOUNTER — OFFICE VISIT (OUTPATIENT)
Dept: OPHTHALMOLOGY | Facility: CLINIC | Age: 83
End: 2023-04-24
Payer: COMMERCIAL

## 2023-04-24 DIAGNOSIS — H52.4 PRESBYOPIA: ICD-10-CM

## 2023-04-24 DIAGNOSIS — H25.813 COMBINED FORMS OF AGE-RELATED CATARACT OF BOTH EYES: ICD-10-CM

## 2023-04-24 DIAGNOSIS — H35.3231 EXUDATIVE AGE-RELATED MACULAR DEGENERATION OF BOTH EYES WITH ACTIVE CHOROIDAL NEOVASCULARIZATION (H): Primary | ICD-10-CM

## 2023-04-24 DIAGNOSIS — H02.403 PTOSIS, BILATERAL: ICD-10-CM

## 2023-04-24 DIAGNOSIS — D31.32 NEVUS, CHOROIDAL, LEFT: ICD-10-CM

## 2023-04-24 DIAGNOSIS — Z01.01 ENCOUNTER FOR EXAMINATION OF EYES AND VISION WITH ABNORMAL FINDINGS: ICD-10-CM

## 2023-04-24 DIAGNOSIS — H43.813 POSTERIOR VITREOUS DETACHMENT, BILATERAL: ICD-10-CM

## 2023-04-24 PROCEDURE — 92014 COMPRE OPH EXAM EST PT 1/>: CPT | Performed by: OPHTHALMOLOGY

## 2023-04-24 PROCEDURE — 92015 DETERMINE REFRACTIVE STATE: CPT | Performed by: OPHTHALMOLOGY

## 2023-04-24 ASSESSMENT — CONF VISUAL FIELD
OD_INFERIOR_NASAL_RESTRICTION: 0
OS_SUPERIOR_NASAL_RESTRICTION: 0
OS_NORMAL: 1
OD_INFERIOR_TEMPORAL_RESTRICTION: 0
OD_SUPERIOR_NASAL_RESTRICTION: 0
OS_INFERIOR_NASAL_RESTRICTION: 0
OS_SUPERIOR_TEMPORAL_RESTRICTION: 0
OD_NORMAL: 1
OS_INFERIOR_TEMPORAL_RESTRICTION: 0
OD_SUPERIOR_TEMPORAL_RESTRICTION: 0

## 2023-04-24 ASSESSMENT — SLIT LAMP EXAM - LIDS: COMMENTS: 1+ DERMATOCHALASIS - UPPER LID, 1+ SCLERAL SHOW, 2+ PTOSIS

## 2023-04-24 ASSESSMENT — TONOMETRY
IOP_METHOD: APPLANATION
OS_IOP_MMHG: 19
OD_IOP_MMHG: 17

## 2023-04-24 ASSESSMENT — EXTERNAL EXAM - LEFT EYE: OS_EXAM: PROLAPSED FAT PADS: UPPER, LOWER

## 2023-04-24 ASSESSMENT — VISUAL ACUITY
OD_CC: 20/150
OD_CC: J16
OS_CC: 20/60
CORRECTION_TYPE: GLASSES
METHOD: SNELLEN - LINEAR
OS_CC: J5

## 2023-04-24 ASSESSMENT — REFRACTION_MANIFEST
OS_CYLINDER: +0.50
OS_AXIS: 007
OD_CYLINDER: +2.00
OS_SPHERE: +0.50
OD_ADD: +3.00
OD_SPHERE: +1.25
OD_AXIS: 167
OS_ADD: +3.00

## 2023-04-24 ASSESSMENT — REFRACTION_WEARINGRX
OD_CYLINDER: +1.75
OS_CYLINDER: +1.50
SPECS_TYPE: TRIFOCAL
OD_SPHERE: +0.50
OS_ADD: +3.00
OD_ADD: +3.00
OS_SPHERE: +0.50
OD_AXIS: 160
OS_AXIS: 010

## 2023-04-24 ASSESSMENT — CUP TO DISC RATIO
OD_RATIO: 0.2
OS_RATIO: 0.2

## 2023-04-24 ASSESSMENT — EXTERNAL EXAM - RIGHT EYE: OD_EXAM: PROLAPSED FAT PADS: UPPER, LOWER

## 2023-04-24 NOTE — PATIENT INSTRUCTIONS
"Glasses prescription given - optional  Continue care with a Retinal Specialist.  May use artificial tears up to four times a day (like Refresh Optive, Systane Balance, TheraTears, or generic artificial tears are ok. Avoid \"get the red out\" drops).   Take a multiple vitamin or \"eye vitamin\" daily (AREDS2).  Protect your eyes outdoors from ultraviolet rays with sunglasses and/or brimmed hat.  Have spinach (cooked or raw), colorful fruits, walnuts, hazelnuts, almonds in your diet.  Monitor the vision in each eye weekly - call if any sudden persistent changes.   Call in December 2023 for an appointment in April 2023 for Complete Exam    Dr. Barreto [phone]    "

## 2023-04-24 NOTE — PROGRESS NOTES
" Current Eye Medications:  Artificial tears twice a day both eyes   Preservision twice a day.     Subjective:  Complete exam: patient states he is satisfied with both his near visual acuity and distance visual acuity.  He is currently seeing Dr. Smith in [place] and is scheduled in May.  He is seen every 7 weeks.       Objective:  See Ophthalmology Exam.       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      ICD-10-CM    1. Exudative age-related macular degeneration of both eyes with active choroidal neovascularization (H)  H35.3231       2. Combined forms of age-related cataract, mild, both eyes  H25.813 EYE EXAM (SIMPLE-NONBILLABLE)      3. Nevus, choroidal, left  D31.32       4. Ptosis, mild, ou  H02.403       5. Posterior vitreous detachment, bilateral  H43.813       6. Encounter for examination of eyes and vision with abnormal findings  Z01.01       7. Presbyopia  H52.4 REFRACTIVE STATUS           Plan:  Glasses prescription given - optional  Continue care with a Retinal Specialist.  May use artificial tears up to four times a day (like Refresh Optive, Systane Balance, TheraTears, or generic artificial tears are ok. Avoid \"get the red out\" drops).   Take a multiple vitamin or \"eye vitamin\" daily (AREDS2).  Protect your eyes outdoors from ultraviolet rays with sunglasses and/or brimmed hat.  Have spinach (cooked or raw), colorful fruits, walnuts, hazelnuts, almonds in your diet.  Monitor the vision in each eye weekly - call if any sudden persistent changes.   Call in December 2023 for an appointment in April 2023 for Complete Exam    Dr. Barreto [phone]       "

## 2023-04-24 NOTE — LETTER
"    4/24/2023         RE: Duane A Otte  [address] [address] [facility] [address]        Dear Colleague,    Thank you for referring your patient, Duane A Otte, to the [facility]. Please see a copy of my visit note below.     Current Eye Medications:  Artificial tears twice a day both eyes   Preservision twice a day.     Subjective:  Complete exam: patient states he is satisfied with both his near visual acuity and distance visual acuity.  He is currently seeing Dr. Smith in [place] and is scheduled in May.  He is seen every 7 weeks.       Objective:  See Ophthalmology Exam.       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      ICD-10-CM    1. Exudative age-related macular degeneration of both eyes with active choroidal neovascularization (H)  H35.3231       2. Combined forms of age-related cataract, mild, both eyes  H25.813 EYE EXAM (SIMPLE-NONBILLABLE)      3. Nevus, choroidal, left  D31.32       4. Ptosis, mild, ou  H02.403       5. Posterior vitreous detachment, bilateral  H43.813       6. Encounter for examination of eyes and vision with abnormal findings  Z01.01       7. Presbyopia  H52.4 REFRACTIVE STATUS           Plan:  Glasses prescription given - optional  Continue care with a Retinal Specialist.  May use artificial tears up to four times a day (like Refresh Optive, Systane Balance, TheraTears, or generic artificial tears are ok. Avoid \"get the red out\" drops).   Take a multiple vitamin or \"eye vitamin\" daily (AREDS2).  Protect your eyes outdoors from ultraviolet rays with sunglasses and/or brimmed hat.  Have spinach (cooked or raw), colorful fruits, walnuts, hazelnuts, almonds in your diet.  Monitor the vision in each eye weekly - call if any sudden persistent changes.   Call in December 2023 for an appointment in April 2023 for Complete Exam    Dr. Barreto [phone]           Again, thank you for allowing me to " participate in the care of your patient.        Sincerely,        Zhen Barreto MD

## 2023-05-18 PROBLEM — H35.3231 EXUDATIVE AGE-RELATED MACULAR DEGENERATION OF BOTH EYES WITH ACTIVE CHOROIDAL NEOVASCULARIZATION (H): Status: ACTIVE | Noted: 2023-05-18

## 2023-08-30 ENCOUNTER — TRANSFERRED RECORDS (OUTPATIENT)
Dept: HEALTH INFORMATION MANAGEMENT | Facility: CLINIC | Age: 83
End: 2023-08-30
Payer: COMMERCIAL

## 2024-04-29 ENCOUNTER — OFFICE VISIT (OUTPATIENT)
Dept: OPHTHALMOLOGY | Facility: CLINIC | Age: 84
End: 2024-04-29
Payer: COMMERCIAL

## 2024-04-29 DIAGNOSIS — D31.32 NEVUS, CHOROIDAL, LEFT: ICD-10-CM

## 2024-04-29 DIAGNOSIS — H02.403 PTOSIS, BILATERAL: ICD-10-CM

## 2024-04-29 DIAGNOSIS — H43.813 POSTERIOR VITREOUS DETACHMENT, BILATERAL: ICD-10-CM

## 2024-04-29 DIAGNOSIS — H52.4 PRESBYOPIA: ICD-10-CM

## 2024-04-29 DIAGNOSIS — Z01.01 ENCOUNTER FOR EXAMINATION OF EYES AND VISION WITH ABNORMAL FINDINGS: ICD-10-CM

## 2024-04-29 DIAGNOSIS — H25.813 COMBINED FORMS OF AGE-RELATED CATARACT OF BOTH EYES: ICD-10-CM

## 2024-04-29 DIAGNOSIS — H35.3231 EXUDATIVE AGE-RELATED MACULAR DEGENERATION OF BOTH EYES WITH ACTIVE CHOROIDAL NEOVASCULARIZATION (H): Primary | ICD-10-CM

## 2024-04-29 PROCEDURE — 92015 DETERMINE REFRACTIVE STATE: CPT | Performed by: OPHTHALMOLOGY

## 2024-04-29 PROCEDURE — 92014 COMPRE OPH EXAM EST PT 1/>: CPT | Performed by: OPHTHALMOLOGY

## 2024-04-29 ASSESSMENT — VISUAL ACUITY
OS_CC+: +1
OS_CC: 20/80
METHOD: SNELLEN - LINEAR
OS_PH_CC: 20/60
OD_CC: 20/100
OS_PH_CC+: -2
CORRECTION_TYPE: GLASSES

## 2024-04-29 ASSESSMENT — TONOMETRY
IOP_METHOD: APPLANATION
OD_IOP_MMHG: 20
OS_IOP_MMHG: 19

## 2024-04-29 ASSESSMENT — REFRACTION_WEARINGRX
OS_AXIS: 010
SPECS_TYPE: TRIFOCAL
OS_ADD: +3.00
OD_AXIS: 160
OD_ADD: +3.00
OS_SPHERE: +0.50
OD_CYLINDER: +1.75
OS_CYLINDER: +1.50
OD_SPHERE: +0.50

## 2024-04-29 ASSESSMENT — CONF VISUAL FIELD
OS_SUPERIOR_TEMPORAL_RESTRICTION: 0
OD_SUPERIOR_TEMPORAL_RESTRICTION: 0
OS_NORMAL: 1
OD_INFERIOR_TEMPORAL_RESTRICTION: 0
OS_INFERIOR_NASAL_RESTRICTION: 0
OD_INFERIOR_NASAL_RESTRICTION: 0
METHOD: COUNTING FINGERS
OS_SUPERIOR_NASAL_RESTRICTION: 0
OS_INFERIOR_TEMPORAL_RESTRICTION: 0
OD_NORMAL: 1
OD_SUPERIOR_NASAL_RESTRICTION: 0

## 2024-04-29 ASSESSMENT — REFRACTION_MANIFEST
OD_CYLINDER: +2.00
OS_SPHERE: PLANO
OD_ADD: +3.25
OS_ADD: +3.25
OS_CYLINDER: +1.50
OS_AXIS: 008
OD_AXIS: 163
OD_SPHERE: +0.75

## 2024-04-29 ASSESSMENT — EXTERNAL EXAM - RIGHT EYE: OD_EXAM: PROLAPSED FAT PADS: UPPER, LOWER

## 2024-04-29 ASSESSMENT — EXTERNAL EXAM - LEFT EYE: OS_EXAM: PROLAPSED FAT PADS: UPPER, LOWER

## 2024-04-29 NOTE — PATIENT INSTRUCTIONS
"Glasses prescription given - optional    May use artificial tears up to four times a day (like Refresh Optive, Systane Balance, or TheraTears. Avoid \"get the red out\" drops and generic artifical tears).     Continue to take a multiple vitamin or \"eye vitamin\" daily (AREDS2).  Protect your eyes outdoors from ultraviolet rays with sunglasses and/or brimmed hat.  Have spinach (cooked or raw), colorful fruits, walnuts, hazelnuts, almonds in your diet.  Monitor the vision in each eye weekly - call if any sudden persistent changes.     Continue care with ANGELIQUE Farrell as directed    Call in December 2024 for an appointment in April 2025 for Complete Exam    Dr. Barreto [phone]    "

## 2024-04-29 NOTE — PROGRESS NOTES
" Current Eye Medications:  refresh - both eyes BID  Preservision - orally BID     Subjective:  comprehensive eye exam - has not updated gls in the last 7+ yrs, noticing he needs brighter light for reading, wants to know if updating gls this yr will help.     Gets injection both eyes q8wks, last injection 4/12/24 - [place] [facility].      Objective:  See Ophthalmology Exam.       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      ICD-10-CM    1. Exudative age-related macular degeneration of both eyes with active choroidal neovascularization (H)  H35.3231       2. Combined forms of age-related cataract, mild, both eyes  H25.813       3. Nevus, choroidal, left  D31.32       4. Ptosis, mild, ou  H02.403       5. Posterior vitreous detachment, bilateral  H43.813       6. Encounter for examination of eyes and vision with abnormal findings  Z01.01       7. Presbyopia  H52.4            Plan:  Glasses prescription given - optional    May use artificial tears up to four times a day (like Refresh Optive, Systane Balance, or TheraTears. Avoid \"get the red out\" drops and generic artifical tears).     Continue to take a multiple vitamin or \"eye vitamin\" daily (AREDS2).  Protect your eyes outdoors from ultraviolet rays with sunglasses and/or brimmed hat.  Have spinach (cooked or raw), colorful fruits, walnuts, hazelnuts, almonds in your diet.  Monitor the vision in each eye weekly - call if any sudden persistent changes.     Continue care with ANGELIQUE Farrell as directed    Call in December 2024 for an appointment in April 2025 for Complete Exam    Dr. Barreto [phone]       "

## 2024-04-29 NOTE — LETTER
"    4/29/2024         RE: Duane A Otte  [address] [address] [facility]  [place] [address]        Dear Colleague,    Thank you for referring your patient, Duane A Otte, to the [facility]. Please see a copy of my visit note below.     Current Eye Medications:  refresh - both eyes BID  Preservision - orally BID     Subjective:  comprehensive eye exam - has not updated gls in the last 7+ yrs, noticing he needs brighter light for reading, wants to know if updating gls this yr will help.     Gets injection both eyes q8wks, last injection 4/12/24 - [place] [facility].      Objective:  See Ophthalmology Exam.       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      ICD-10-CM    1. Exudative age-related macular degeneration of both eyes with active choroidal neovascularization (H)  H35.3231       2. Combined forms of age-related cataract, mild, both eyes  H25.813       3. Nevus, choroidal, left  D31.32       4. Ptosis, mild, ou  H02.403       5. Posterior vitreous detachment, bilateral  H43.813       6. Encounter for examination of eyes and vision with abnormal findings  Z01.01       7. Presbyopia  H52.4            Plan:  Glasses prescription given - optional    May use artificial tears up to four times a day (like Refresh Optive, Systane Balance, or TheraTears. Avoid \"get the red out\" drops and generic artifical tears).     Continue to take a multiple vitamin or \"eye vitamin\" daily (AREDS2).  Protect your eyes outdoors from ultraviolet rays with sunglasses and/or brimmed hat.  Have spinach (cooked or raw), colorful fruits, walnuts, hazelnuts, almonds in your diet.  Monitor the vision in each eye weekly - call if any sudden persistent changes.     Continue care with ANGELIQUE Farrell as directed    Call in December 2024 for an appointment in April 2025 for Complete Exam    Dr. Barreto [phone]           Again, thank you for allowing me to participate in the care of " your patient.        Sincerely,        Zhen Barreto MD

## 2024-05-23 ENCOUNTER — TELEPHONE (OUTPATIENT)
Dept: OPHTHALMOLOGY | Facility: CLINIC | Age: 84
End: 2024-05-23
Payer: COMMERCIAL

## 2024-05-23 NOTE — TELEPHONE ENCOUNTER
[facility] Call Center    Phone Message    May a detailed message be left on voicemail: yes     Reason for Call: Other: Kelli calling from [facility] as they're unable to do a trifocal in the 325 as they can only do it in a plus 3 or plus 350 - please call her back to discuss if this is an option or not at  and if it is possible to switch please send a new fax over to [phone], she is still requesting a phone call confirmation though as well, thanks     Action Taken: Other: EYE    Travel Screening: Not Applicable

## 2024-06-30 ENCOUNTER — HEALTH MAINTENANCE LETTER (OUTPATIENT)
Age: 84
End: 2024-06-30

## 2024-07-29 ENCOUNTER — TRANSFERRED RECORDS (OUTPATIENT)
Dept: HEALTH INFORMATION MANAGEMENT | Facility: CLINIC | Age: 84
End: 2024-07-29
Payer: COMMERCIAL

## 2025-04-14 ENCOUNTER — TRANSFERRED RECORDS (OUTPATIENT)
Dept: HEALTH INFORMATION MANAGEMENT | Facility: CLINIC | Age: 85
End: 2025-04-14
Payer: COMMERCIAL

## 2025-04-30 ENCOUNTER — OFFICE VISIT (OUTPATIENT)
Dept: OPHTHALMOLOGY | Facility: CLINIC | Age: 85
End: 2025-04-30
Payer: COMMERCIAL

## 2025-04-30 DIAGNOSIS — H02.403 PTOSIS, BILATERAL: ICD-10-CM

## 2025-04-30 DIAGNOSIS — H35.3231 EXUDATIVE AGE-RELATED MACULAR DEGENERATION OF BOTH EYES WITH ACTIVE CHOROIDAL NEOVASCULARIZATION (H): Primary | ICD-10-CM

## 2025-04-30 DIAGNOSIS — Z01.01 ENCOUNTER FOR EXAMINATION OF EYES AND VISION WITH ABNORMAL FINDINGS: ICD-10-CM

## 2025-04-30 DIAGNOSIS — H52.4 PRESBYOPIA: ICD-10-CM

## 2025-04-30 DIAGNOSIS — D31.32 NEVUS, CHOROIDAL, LEFT: ICD-10-CM

## 2025-04-30 DIAGNOSIS — H43.813 POSTERIOR VITREOUS DETACHMENT, BILATERAL: ICD-10-CM

## 2025-04-30 DIAGNOSIS — H25.813 COMBINED FORMS OF AGE-RELATED CATARACT OF BOTH EYES: ICD-10-CM

## 2025-04-30 PROCEDURE — 92014 COMPRE OPH EXAM EST PT 1/>: CPT | Performed by: OPHTHALMOLOGY

## 2025-04-30 PROCEDURE — 92015 DETERMINE REFRACTIVE STATE: CPT | Performed by: OPHTHALMOLOGY

## 2025-04-30 RX ORDER — DEXTRAN 70 AND HYPROMELLOSE 2910 1; 3 MG/ML; MG/ML
1 SOLUTION/ DROPS OPHTHALMIC 2 TIMES DAILY
COMMUNITY

## 2025-04-30 ASSESSMENT — REFRACTION_WEARINGRX
OD_SPHERE: +0.50
OS_SPHERE: +0.50
OD_CYLINDER: +1.75
OS_ADD: +3.00
OD_AXIS: 160
SPECS_TYPE: TRIFOCAL
OD_ADD: +3.00
OS_CYLINDER: +1.50
OS_AXIS: 010

## 2025-04-30 ASSESSMENT — CONF VISUAL FIELD
METHOD: COUNTING FINGERS
OS_INFERIOR_NASAL_RESTRICTION: 0
OD_INFERIOR_TEMPORAL_RESTRICTION: 0
OS_NORMAL: 1
OS_SUPERIOR_NASAL_RESTRICTION: 0
OD_INFERIOR_NASAL_RESTRICTION: 3
OS_SUPERIOR_TEMPORAL_RESTRICTION: 0
OD_SUPERIOR_TEMPORAL_RESTRICTION: 0
OS_INFERIOR_TEMPORAL_RESTRICTION: 0
OD_SUPERIOR_NASAL_RESTRICTION: 0

## 2025-04-30 ASSESSMENT — REFRACTION_MANIFEST
OD_ADD: +3.00
OD_AXIS: 008
OD_SPHERE: PLANO
OS_SPHERE: PLANO
OD_CYLINDER: +1.50
OS_AXIS: 031
OS_ADD: +3.00
OS_CYLINDER: +1.00

## 2025-04-30 ASSESSMENT — EXTERNAL EXAM - LEFT EYE: OS_EXAM: PROLAPSED FAT PADS: UPPER, LOWER

## 2025-04-30 ASSESSMENT — VISUAL ACUITY
OS_CC: 20/200
METHOD: SNELLEN - LINEAR
OD_CC: CF@3'
CORRECTION_TYPE: GLASSES

## 2025-04-30 ASSESSMENT — TONOMETRY
OS_IOP_MMHG: 19
OD_IOP_MMHG: 19
IOP_METHOD: APPLANATION

## 2025-04-30 ASSESSMENT — CUP TO DISC RATIO
OS_RATIO: 0.2
OD_RATIO: 0.2

## 2025-04-30 ASSESSMENT — EXTERNAL EXAM - RIGHT EYE: OD_EXAM: PROLAPSED FAT PADS: UPPER, LOWER

## 2025-04-30 NOTE — PATIENT INSTRUCTIONS
"May use artificial tears up to four times a day (like Refresh Optive, Systane Balance, or TheraTears. Avoid \"get the red out\" drops and generic artifical tears).     Continue to take a multiple vitamin or \"eye vitamin\" daily (AREDS2).  Protect your eyes outdoors from ultraviolet rays with sunglasses and/or brimmed hat.  Have spinach (cooked or raw), colorful fruits, walnuts, hazelnuts, almonds in your diet.  Monitor the vision in each eye weekly - call if any sudden persistent changes.     Continue care with your retina specialist as directed.    Zhen Barreto M.D.  [phone]    "

## 2025-04-30 NOTE — PROGRESS NOTES
" Current Eye Medications:  genteal twice a day both eyes. Areds 2 twice a day, once in while only gets once a day.     Subjective:  Complete eye exam. Dr Christianson retinal specialist thought may want to consider doing cataract surgery. Vision is decreased little in the distance both eyes. Very difficult to read up close. Has to use magnifying glass. Yesterday in left eye, saw little quick small red spot. Happened twice, couple weeks apart. No eye pain or discomfort in either eye.   Last retinal injection was 4/14/25 both eyes (does 9 weeks apart).  Was having floater left eye after injection. Took a few days for vision to improve (doesn't usually take that long)     Objective:  See Ophthalmology Exam.       Assessment:  Cataracts visually significant right eye > left eye in patient with active antiVEGF treatment for exudative age related maculopathy both eyes.      ICD-10-CM    1. Exudative age-related macular degeneration of both eyes with active choroidal neovascularization (H)  H35.3231       2. Combined forms of age-related cataract, mod, of both eyes  H25.813       3. Nevus, choroidal, left  D31.32       4. Ptosis, mild, ou  H02.403       5. Posterior vitreous detachment, bilateral  H43.813       6. Encounter for examination of eyes and vision with abnormal findings  Z01.01       7. Presbyopia  H52.4            Plan:  May use artificial tears up to four times a day (like Refresh Optive, Systane Balance, or TheraTears. Avoid \"get the red out\" drops and generic artifical tears).     Continue to take a multiple vitamin or \"eye vitamin\" daily (AREDS2).  Protect your eyes outdoors from ultraviolet rays with sunglasses and/or brimmed hat.  Have spinach (cooked or raw), colorful fruits, walnuts, hazelnuts, almonds in your diet.  Monitor the vision in each eye weekly - call if any sudden persistent changes.     Continue care with your retina specialist as directed.    Can schedule a cataract evaluation with Dr. Cantu " anytime.    Zhen Barreto M.D.  [phone]

## 2025-04-30 NOTE — Clinical Note
4/30/2025      Duane A Otte  [address] [address] [facility] [address]      Dear Colleague,    Thank you for referring your patient, Duane A Otte, to the [facility]. Please see a copy of my visit note below.    No notes on file    Again, thank you for allowing me to participate in the care of your patient.        Sincerely,        Zhen Barreto MD    Electronically signed

## 2025-05-21 ENCOUNTER — OFFICE VISIT (OUTPATIENT)
Dept: OPHTHALMOLOGY | Facility: CLINIC | Age: 85
End: 2025-05-21
Payer: COMMERCIAL

## 2025-05-21 DIAGNOSIS — H35.3231 EXUDATIVE AGE-RELATED MACULAR DEGENERATION OF BOTH EYES WITH ACTIVE CHOROIDAL NEOVASCULARIZATION (H): Primary | ICD-10-CM

## 2025-05-21 DIAGNOSIS — H02.403 PTOSIS, BILATERAL: ICD-10-CM

## 2025-05-21 DIAGNOSIS — H25.813 COMBINED FORMS OF AGE-RELATED CATARACT OF BOTH EYES: ICD-10-CM

## 2025-05-21 DIAGNOSIS — H43.813 POSTERIOR VITREOUS DETACHMENT, BILATERAL: ICD-10-CM

## 2025-05-21 DIAGNOSIS — D31.32 NEVUS, CHOROIDAL, LEFT: ICD-10-CM

## 2025-05-21 PROCEDURE — 92014 COMPRE OPH EXAM EST PT 1/>: CPT | Performed by: STUDENT IN AN ORGANIZED HEALTH CARE EDUCATION/TRAINING PROGRAM

## 2025-05-21 ASSESSMENT — VISUAL ACUITY
OD_CC: CF@2
METHOD: SNELLEN - LINEAR
OS_CC: 20/150
CORRECTION_TYPE: GLASSES

## 2025-05-21 ASSESSMENT — REFRACTION_MANIFEST
OS_AXIS: 017
OS_SPHERE: -3.00
OS_CYLINDER: +2.50

## 2025-05-21 ASSESSMENT — REFRACTION_WEARINGRX
OS_CYLINDER: +1.50
OD_ADD: +3.00
OS_ADD: +3.00
OD_SPHERE: +0.50
OD_AXIS: 160
OS_SPHERE: +0.50
SPECS_TYPE: TRIFOCAL
OD_CYLINDER: +1.75
OS_AXIS: 010

## 2025-05-21 ASSESSMENT — TONOMETRY
OS_IOP_MMHG: 16
IOP_METHOD: APPLANATION
OD_IOP_MMHG: 18

## 2025-05-21 ASSESSMENT — EXTERNAL EXAM - LEFT EYE: OS_EXAM: PROLAPSED FAT PADS: UPPER, LOWER

## 2025-05-21 ASSESSMENT — CUP TO DISC RATIO
OS_RATIO: 0.2
OD_RATIO: 0.2

## 2025-05-21 ASSESSMENT — EXTERNAL EXAM - RIGHT EYE: OD_EXAM: PROLAPSED FAT PADS: UPPER, LOWER

## 2025-05-21 NOTE — PATIENT INSTRUCTIONS
Offered cataract surgery of the right eye then left eye at [facility]      Surgery Date(s): June 30, 2025 for your right eye   & July 21, 2025 for your left eye        Our surgery schedulers will mail your appointments to you     Continue lubrication with Genteal drops twice a day in both eyes and using AREDS2 eye vitamins by mouth    Karlos Cantu MD  [phone]

## 2025-05-21 NOTE — PROGRESS NOTES
Current Eye Medications:  Genteal drops twice a day both eyes. AREDS 2 twice a day     Subjective:  referred from Dr. Barreto for cataract evaluation. Patient has AMD, seeing Retina every four weeks for right eye and nine weeks for left eye for injection. June 9th is the next scheduled injection for the right eye.     Flomax (2021)     Objective:  See Ophthalmology Exam.       Assessment:  Duane A Otte is a 84 year old male who presents with:   Encounter Diagnoses   Name Primary?    Exudative age-related macular degeneration of both eyes with active choroidal neovascularization (H)     Combined forms of age-related cataract, mod, of both eyes Visually significant cataracts both eyes. Recommend CE/IOL right eye then left eye. Discussed guarded prognosis given macular degeneration, but expect more light to be let into the eye, which can be helpful. Injections both eyes.  Dil 7. Flomax. Trypan. Anticipate targeting mostly distance both eyes.     Nevus, choroidal, left     Ptosis, mild, ou     Posterior vitreous detachment, bilateral      Visually significant cataract that is interfering with daily activities of living. Plan for cataract extraction and intraocular lens implant right eye, then left eye.  Risks, benefits, complications, and alternatives discussed with patient including possibility of limitations from coexistent eye disease and loss of vision. Target refraction and lens options discussed.  Patient understands and wishes to proceed with surgery.     Plan:  Offered cataract surgery of the right eye then left eye at [facility]      Surgery Date(s): June 30, 2025 for your right eye   & July 21, 2025 for your left eye        Our surgery schedulers will mail your appointments to you     Continue lubrication with Genteal drops twice a day in both eyes and using AREDS2 eye vitamins by mouth    Karlos Cantu MD  [phone]

## 2025-05-21 NOTE — LETTER
5/21/2025      Duane A Otte  [id number]  [place] [address]      Dear Colleague,    Thank you for referring your patient, Duane A Otte, to the [facility]. Please see a copy of my visit note below.     Current Eye Medications:  Genteal drops twice a day both eyes. AREDS 2 twice a day     Subjective:  referred from Dr. Barreto for cataract evaluation. Patient has AMD, seeing [facility] every four weeks for right eye and nine weeks for left eye for injection. June 9th is the next scheduled injection for the right eye.     Flomax (2021)     Objective:  See Ophthalmology Exam.       Assessment:  Duane A Otte is a 84 year old male who presents with:   Encounter Diagnoses   Name Primary?     Exudative age-related macular degeneration of both eyes with active choroidal neovascularization (H)      Combined forms of age-related cataract, mod, of both eyes Visually significant cataracts both eyes. Recommend CE/IOL right eye then left eye. Discussed guarded prognosis given macular degeneration, but expect more light to be let into the eye, which can be helpful. Injections both eyes.  Dil 7. Flomax. Trypan. Anticipate targeting mostly distance both eyes.      Nevus, choroidal, left      Ptosis, mild, ou      Posterior vitreous detachment, bilateral      Visually significant cataract that is interfering with daily activities of living. Plan for cataract extraction and intraocular lens implant right eye, then left eye.  Risks, benefits, complications, and alternatives discussed with patient including possibility of limitations from coexistent eye disease and loss of vision. Target refraction and lens options discussed.  Patient understands and wishes to proceed with surgery.     Plan:  Offered cataract surgery of the right eye then left eye at [facility]      Surgery Date(s): June 30, 2025 for your right eye   & July 21, 2025 for your left eye        Our surgery schedulers will mail your  appointments to you     Continue lubrication with Genteal drops twice a day in both eyes and using AREDS2 eye vitamins by mouth    Karlos Cantu MD  [phone]         Again, thank you for allowing me to participate in the care of your patient.        Sincerely,        Karlos Cantu MD    Electronically signed

## 2025-05-29 ENCOUNTER — TELEPHONE (OUTPATIENT)
Dept: OPHTHALMOLOGY | Facility: CLINIC | Age: 85
End: 2025-05-29

## 2025-05-29 ENCOUNTER — PREP FOR PROCEDURE (OUTPATIENT)
Dept: OPHTHALMOLOGY | Facility: CLINIC | Age: 85
End: 2025-05-29
Payer: COMMERCIAL

## 2025-05-29 DIAGNOSIS — H25.812 COMBINED FORM OF AGE-RELATED CATARACT, LEFT EYE: ICD-10-CM

## 2025-05-29 DIAGNOSIS — H25.811 COMBINED FORM OF AGE-RELATED CATARACT, RIGHT EYE: Primary | ICD-10-CM

## 2025-05-29 NOTE — TELEPHONE ENCOUNTER
Type of surgery: RIGHT PHACOEMULSIFICATION, CATARACT, WITH INTRAOCULAR LENS IMPLANT (Right)   Location of surgery: [facility]  Date and time of surgery: 06/30/2025  Surgeon: RAJESH  Pre-Op Appt Date: MARISOL  Post-Op Appt Date: 07/01/2025   Packet sent out: No  Pre-cert/Authorization completed:  No  Date:

## 2025-05-29 NOTE — TELEPHONE ENCOUNTER
Type of surgery: LEFT PHACOEMULSIFICATION, CATARACT, WITH INTRAOCULAR LENS IMPLANT (Left)   Location of surgery: [facility]  Date and time of surgery: 07/21/2025  Surgeon: RAJESH  Pre-Op Appt Date: MARISOL  Post-Op Appt Date: 07/22/2025   Packet sent out: No  Pre-cert/Authorization completed:  No  Date:

## 2025-06-12 ENCOUNTER — OFFICE VISIT (OUTPATIENT)
Dept: OPHTHALMOLOGY | Facility: CLINIC | Age: 85
End: 2025-06-12
Payer: COMMERCIAL

## 2025-06-12 DIAGNOSIS — H35.3231 EXUDATIVE AGE-RELATED MACULAR DEGENERATION OF BOTH EYES WITH ACTIVE CHOROIDAL NEOVASCULARIZATION (H): ICD-10-CM

## 2025-06-12 DIAGNOSIS — H25.811 COMBINED FORM OF AGE-RELATED CATARACT, RIGHT EYE: Primary | ICD-10-CM

## 2025-06-12 DIAGNOSIS — H25.812 COMBINED FORM OF AGE-RELATED CATARACT, LEFT EYE: ICD-10-CM

## 2025-06-12 RX ORDER — DEXAMETHASONE ACETATE, MICRO 100 %
1 POWDER (GRAM) MISCELLANEOUS 4 TIMES DAILY
Qty: 1 G | Refills: 1 | Status: SHIPPED | OUTPATIENT
Start: 2025-06-12

## 2025-06-12 RX ORDER — MONOCHLOROACETIC ACID
1 CRYSTALS MISCELLANEOUS 4 TIMES DAILY
Qty: 1 G | Refills: 1 | Status: SHIPPED | OUTPATIENT
Start: 2025-06-12

## 2025-06-12 RX ORDER — BROMFENAC SODIUM 100 %
1 POWDER (GRAM) MISCELLANEOUS 4 TIMES DAILY
Qty: 1 G | Refills: 1 | Status: SHIPPED | OUTPATIENT
Start: 2025-06-12

## 2025-06-12 ASSESSMENT — KERATOMETRY
OS_AXISANGLE_DEGREES: 004
OD_K1POWER_DIOPTERS: 42.00
OS_K1POWER_DIOPTERS: 42.25
OS_K2POWER_DIOPTERS: 43.62
OD_K2POWER_DIOPTERS: 42.00

## 2025-06-12 ASSESSMENT — VISUAL ACUITY
OD_SC: CF 2'
OS_SC: 20/150
METHOD: SNELLEN - LINEAR

## 2025-06-12 ASSESSMENT — EXTERNAL EXAM - LEFT EYE: OS_EXAM: PROLAPSED FAT PADS: UPPER, LOWER

## 2025-06-12 ASSESSMENT — EXTERNAL EXAM - RIGHT EYE: OD_EXAM: PROLAPSED FAT PADS: UPPER, LOWER

## 2025-06-12 NOTE — PATIENT INSTRUCTIONS
PRE-OP CATARACT INSTRUCTIONS    ** [facility] will call you to collect your payment info and shipping address.    *Use the following drops in the right eye 4 times on the day before surgery and once the morning of surgery:                                   [email or web address]     *No solid food or drink after midnight on the morning of surgery. Any medications you would normally take in the morning, you can take AFTER surgery.      Karlos Cantu MD  [phone]

## 2025-06-12 NOTE — LETTER
6/12/2025      Duane A Otte  [address]  [place] [address]      Dear Colleague,    Thank you for referring your patient, Duane A Otte, to the [facility]. Please see a copy of my visit note below.     Current Eye Medications:  Artificial Tears/Genteal Tears     Subjective:  Patient is here today for a cataract consult with IOL Master. He is scheduled for the right eye on 06/30/2025 and the left eye on 07/21/2025.      Objective:  See Ophthalmology Exam.       Assessment:  Duane A Otte is a 84 year old male who presents with:   Encounter Diagnoses   Name Primary?     Combined form of age-related cataract, right eye Cataract pre-op right eye. Dil 7. Flomax. Trypan. Target mostly distance both eyes. CA3.      Combined form of age-related cataract, left eye      Exudative age-related macular degeneration of both eyes with active choroidal neovascularization (H)        Plan:  PRE-OP CATARACT INSTRUCTIONS    ** [facility] will call you to collect your payment info and shipping address.    *Use the following drops in the right eye 4 times on the day before surgery and once the morning of surgery:                                   [email or web address]     *No solid food or drink after midnight on the morning of surgery. Any medications you would normally take in the morning, you can take AFTER surgery.      Karlos Cantu MD  [phone]     Again, thank you for allowing me to participate in the care of your patient.        Sincerely,        Karlos Cantu MD    Electronically signed

## 2025-06-12 NOTE — PROGRESS NOTES
Current Eye Medications:  Artificial Tears/Genteal Tears     Subjective:  Patient is here today for a cataract consult with IOL Master. He is scheduled for the right eye on 06/30/2025 and the left eye on 07/21/2025.      Objective:  See Ophthalmology Exam.       Assessment:  Duane A Otte is a 84 year old male who presents with:   Encounter Diagnoses   Name Primary?    Combined form of age-related cataract, right eye Cataract pre-op right eye. Dil 7. Flomax. Trypan. Target mostly distance both eyes. CA3.     Combined form of age-related cataract, left eye     Exudative age-related macular degeneration of both eyes with active choroidal neovascularization (H)        Plan:  PRE-OP CATARACT INSTRUCTIONS    ** [facility] will call you to collect your payment info and shipping address.    *Use the following drops in the right eye 4 times on the day before surgery and once the morning of surgery:                                   [email or web address]     *No solid food or drink after midnight on the morning of surgery. Any medications you would normally take in the morning, you can take AFTER surgery.      Karlos Cantu MD  [phone]

## 2025-06-19 NOTE — H&P (VIEW-ONLY)
Preoperative History and Physical Examination    Date of Exam: 6/19/2025  Proposed Surgery: right cataract repair then left  Surgeon: Dr. Karlos Cantu  Date of Surgery: 6-30-25 and 7-21-25  Location of Surgery: [facility]  Fax number:     Duane A Otte is an 84 y.o. male who presents for a preoperative clearance history and physical exam at the request of the above mentioned surgeon for the surgery indicated.     Copy of this evaluation and/or correspondence will be faxed to the above hospital/surgery center and/or surgeon's office.     INDICATION FOR SURGERY: 84-year-old man with bilateral cataracts and reduced visual acuity.    HPI:     He has been treated for macular degeneration and states his vision has gradually worsened to the point where he is not driving anymore.  His wife Coral had had not driven for years, decades but is now driving again.    Ophthalmology wants to correct his cataracts and see how much improvement he gets with his vision.      Duane does have a history of mild cognitive disorder.      Longtime chronic opioid use for chronic pain initially started by a pain doctor who worked with spinal surgeons and then that doctor passed away and I assumed the management of his medications for pain.  He declined a couple years ago to transfer opioid management to a local pain clinic.                    Results for orders placed or performed in visit on 05/14/25   CBC (HGB,HCT,WBC,RBC,PLATELET) OP   Result Value Ref Range    WBC OP 5.4 4.3 - 10.8 K/UL    RBC OP 3.71 (L) 4.60 - 6.20 M/UL    HEMOGLOBIN OP 11.3 (L) 14.0 - 18.0 gm/dL    HEMATOCRIT OP 36.2 (L) 40.0 - 54.0 %    MCV OP 98 80 - 100 fL    MCH OP 30.5 27.0 - 33.0 pg    MCHC OP 31.2 (L) 33.0 - 36.0 gm/dL    RDW OP 14.0 11.5 - 14.5 %    PLATELET COUNT  150 - 400 K/UL    MPV OP 10.1 6.5 - 12.0 fL   COMPREHENSIVE METABOLIC PANEL OP   Result Value Ref Range    SODIUM  136 - 145 mmol/L    POTASSIUM OP 4.4 3.5  - 5.1 mmol/L    CHLORIDE  98 - 107 mmol/L    CARBON DIOXIDE OP 29 21 - 32 mmol/L    BUN (UREA NITRO) OP 24 (H) 7 - 18 mg/dL    CREATININE OP 1.02 0.80 - 1.30 mg/dL    EST GFR (CKD-EPI) OP >60 >60 mL/min/1.73m2    GLUCOSE  (H) 70 - 110 mg/dL    CALCIUM, SERUM OP 9.2 8.5 - 10.1 mg/dL    ANION GAP OP 7.0 0.0 - 15.0 mmol/L    ALBUMIN OP 3.8 3.4 - 5.0 g/dL    BILIRUBIN-TOTAL OP 0.4 0.2 - 1.0 mg/dL    ALKALINE P'TASE OP 83 45 - 117 IU/L    PROTEIN TOTAL OP 6.8 6.4 - 8.2 g/dL    AST (SGOT) OP 17 15 - 37 IU/L    ALT (SGPT) OP 26 12 - 68 IU/L   HGB A1C (GLYCO HGB) OP   Result Value Ref Range    HgbA1c OP 5.8 (H) <=5.6 %    EST AVERAGE GLUCOSE  (H) <=114 mg/dL         CURRENT COMORBIDITIES: Hypertension, chronic pain, chronic opioid use, mild cognitive impairment.    USE OF ANTITHROMBOTIC: None            Patient Active Problem List    Diagnosis Date Noted     Primary osteoarthritis of right knee 08/28/2024     Primary osteoarthritis of right shoulder 08/28/2024     Esophageal dysphagia 07/10/2024     Posterior vitreous detachment 09/27/2023     AMD (age related macular degeneration) 08/30/2023     Urinary hesitancy 01/21/2022     Chronic, continuous use of opioids 02/14/2020     Other insomnia 03/07/2019     DDD (degenerative disc disease), lumbar 12/27/2018     Neuropathy 12/27/2018     Chronic pain syndrome 06/21/2018     Elevated prostate specific antigen (PSA) 06/21/2018     Essential hypertension 06/21/2018     Other testicular hypofunction 06/21/2018     Osteopenia 06/21/2018     Vitamin D deficiency 06/21/2018     Normocytic anemia 06/21/2018     Combined forms of age-related cataract of both eyes 04/10/2018     Nevus, choroidal, left 03/05/2016     Anxiety 12/18/2015     Mild cognitive impairment 12/18/2015     Spinal stenosis 12/18/2015     Chronic constipation 04/18/2012     Ptosis, bilateral 02/01/2011     Mixed hyperlipidemia 03/28/2009     LVH (left ventricular hypertrophy) 03/28/2009                Past Surgical History:   Procedure Laterality Date     HB CYSTOURETHROSCOPY W/REMOVAL  1996    x 2     HISTORICAL COLONOSCOPY (NO CHARGE)  08/02/2012     HX CARPAL TUNNEL RELEASE Left 02/2012     HX HERNIA REPAIR Bilateral     x 2     HX LUMBAR FUSION  12/2005    Anterior approach     HX LUMBAR LAMINECTOMY  1988    2 segment     HX LUMBAR LAMINECTOMY  08/2004    one level     HX POLYPECTOMY  08/2012    Adenomatous colon polyp     [id number]      Hemorrhoidectomy         Current Outpatient Medications:   Medication Sig     ascorbic acid, vitamin C, 500 mg oral tablet Take once a day     calcium carbonate, 600 mg elemental calcium, 600 mg calcium (1,500 mg) oral tablet take 2 tabs once a day     Cholecalciferol, Vitamin D3, 2,000 unit oral capsule take 1 capsule daily     citalopram (CELEXA) 20 mg oral tablet Take 1 tablet (20 mg) by mouth once daily.     cyanocobalamin 100 mcg oral Tab Take by mouth once daily.     donepeziL (ARICEPT) 5 mg oral tablet Take 1 tablet (5 mg) by mouth once daily.     HYDROcodone-acetaminophen  mg oral tablet Take 1 tablet by mouth every 4 (four) hours as needed (Chronic pain. Max = 5 tabs per day).     lisinopriL (PRINIVIL) 10 mg oral tablet Take 1 tablet (10 mg) by mouth once daily. for blood pressure     MORphine (MS CONTIN) 30 mg oral extended release tablet 12 HR Take 1 tablet (30 mg) by mouth twice a day. TAKE 1 TABLET BY MOUTH TWICE DAILY FOR CHRONIC PAIN     multivitamin oral tablet take 1 by Oral route once     naloxone (NARCAN) 4 mg/actuation Nasal Spray Instill 1 spray (4 mg) into one nostril as directed. Seek emergency care immediately after use.     traZODone (DESYREL) 50 mg oral tablet Take 1 tablet (50 mg) by mouth at bedtime as needed for sleep     triamcinolone acetonide (Kenalog) 0.1% cream Apply to skin twice a day as needed.     vit C/E/Zn/coppr/lutein/zeaxan (PRESERVISION AREDS-2 ORAL) Take by mouth. 2 daily       Allergies   Allergen Reactions      Duloxetine Hcl Other     insomnia       No past medical history on file.    Past Surgical History:   Procedure Laterality Date     HB CYSTOURETHROSCOPY W/REMOVAL  1996    x 2     HISTORICAL COLONOSCOPY (NO CHARGE)  08/02/2012     HX CARPAL TUNNEL RELEASE Left 02/2012     HX HERNIA REPAIR Bilateral     x 2     HX LUMBAR FUSION  12/2005    Anterior approach     HX LUMBAR LAMINECTOMY  1988    2 segment     HX LUMBAR LAMINECTOMY  08/2004    one level     HX POLYPECTOMY  08/2012    Adenomatous colon polyp     [id number]      Hemorrhoidectomy       Family History   Problem Relation Name Age of Onset     Alzheimer's Disease Mother         Social History     Tobacco Use     Smoking status: Never     Passive exposure: Never     Smokeless tobacco: Never   Vaping Use     Vaping status: Never Used   Substance Use Topics     Alcohol use: Not Currently     Drug use: Never       History of Anesthetic Reaction (personal or family)?  No    Recent steroid use (last 6 months)?   No      Immunization History   Administered Date(s) Administered     H1N1 Influenza 12/22/2009, 09/30/2010     Influenza High Dose (Fluzone Quadrivalent PF) 09/19/2013, 11/05/2016, 11/02/2017, 09/23/2021, 10/28/2022     Influenza recombinant (FluBlok Quadrivalent PF) 08/27/2010     Influenza split virus quadrivalent 11/03/1995, 10/03/1996, 10/14/2004, 10/03/2005, 10/28/2007, 09/17/2009, 08/27/2010, 10/01/2011, 10/26/2012     Yoon COVID-19 03/11/2021     Pfizer 12+ Yrs Bivalent COVID Vaccine (gray cap) 10/06/2022     Pfizer 12+ Yrs Monovalent COVID Vaccine (gray cap) 06/21/2022     Pneumococcal PCV13 05/11/2016     Pneumococcal PPSV23 10/03/2005, 10/28/2007     Td 08/30/1991, 11/23/1997, 12/21/2012     Td adult absorbed PF (2 Lf) 07/03/2002, 12/21/2012     Zoster Live 06/04/2010     Zoster Recombinant 10/23/2019, 12/23/2019     Review of Systems   Musculoskeletal:  Positive for arthralgias and back pain.       REVISED CARDIAC RISK INDEX:   History of Ischemic  "Heart Disease: no  History of Congestive Heart Failure: no  History of Cerebrovascular Disease (stroke/TIA): no  History of Diabetes requiring preoperative insulin use: no  Chronic Kidney Disease (Cr > 2): no  Undergoing suprainguinal vascular, intraperitoneal, or intrathoracic surgery: no  Risk for cardiac death, non-fatal MI, and non-fatal cardiac arrest: 0 predictors - 0.4%    PHYSICAL EXAM:   /74   Pulse 68   Temp 98  F (36.7  C) (Oral)   Ht 5' 9\" (1.753 m)   Wt 69.9 kg (154 lb)   BMI 22.74 kg/m    LMP:           General - Alert man with his wife.  Head - Normocephalic, atraumatic.  Eyes - Pupils are equal, round .  Ptosis noted  Ears - Tympanic membranes clear bilaterally. External canals without lesion.  Nose -grossly normal  Mouth - Oropharynx is clear without exudates.  Neck - Normal appearing, no cervical adenopathy   Lungs - Clear to auscultation bilaterally, no wheezes, rales or rhonchi.  CV - Regular rate and rhythm, no murmurs, rubs or gallops.  Abdomen - Non-tender  Extremities - No edema or deformities.   Skin - warm, dry, intact. No rashes or erythema.  Neurologic -grossly normal  Psych -fair historian    LABS:      ASSESSMENT/PLAN:              Duane was seen today for pre op exam.    Diagnoses and all orders for this visit:    Preop examination    Age-related cataract of both eyes, unspecified age-related cataract type    AMD (age related macular degeneration)            RECOMMENDATIONS:   Preoperative Risk Assesment:  - Based on the inherent risks of procedure, anesthesia, and the patient's comorbid medical conditions, the patient is stratified as a low risk candidate for the planned procedure.  - Further tests are not advised to further risk stratify prior to surgery.      Additional recommendations:         Patient Instructions   Take your preop eyedrops as directed by Dr. Cantu.    Take your lisinopril on the morning of your procedure.    You said that the eye doctor directed you " not to take your pain medication on the morning of your procedure.  You should be to take the when you get home after cataract procedure.              Benjamin Ruiz MD    Total time spent today for visit was 32 minutes and included: Direct face-to-face time, Review of records, Coordination of care, and Documentation of visit.

## 2025-06-27 ENCOUNTER — ANESTHESIA EVENT (OUTPATIENT)
Dept: SURGERY | Facility: AMBULATORY SURGERY CENTER | Age: 85
End: 2025-06-27
Payer: COMMERCIAL

## 2025-06-30 ENCOUNTER — HOSPITAL ENCOUNTER (OUTPATIENT)
Facility: AMBULATORY SURGERY CENTER | Age: 85
Discharge: HOME | End: 2025-06-30
Attending: STUDENT IN AN ORGANIZED HEALTH CARE EDUCATION/TRAINING PROGRAM | Admitting: STUDENT IN AN ORGANIZED HEALTH CARE EDUCATION/TRAINING PROGRAM
Payer: COMMERCIAL

## 2025-06-30 ENCOUNTER — ANESTHESIA (OUTPATIENT)
Dept: SURGERY | Facility: AMBULATORY SURGERY CENTER | Age: 85
End: 2025-06-30
Payer: COMMERCIAL

## 2025-06-30 VITALS
RESPIRATION RATE: 16 BRPM | TEMPERATURE: 97.1 F | DIASTOLIC BLOOD PRESSURE: 65 MMHG | HEART RATE: 59 BPM | OXYGEN SATURATION: 97 % | SYSTOLIC BLOOD PRESSURE: 144 MMHG | WEIGHT: 160 LBS

## 2025-06-30 DIAGNOSIS — H25.811 COMBINED FORM OF AGE-RELATED CATARACT, RIGHT EYE: Primary | ICD-10-CM

## 2025-06-30 PROCEDURE — 66984 XCAPSL CTRC RMVL W/O ECP: CPT | Mod: RT | Performed by: STUDENT IN AN ORGANIZED HEALTH CARE EDUCATION/TRAINING PROGRAM

## 2025-06-30 PROCEDURE — G8907 PT DOC NO EVENTS ON DISCHARG: HCPCS

## 2025-06-30 PROCEDURE — G8918 PT W/O PREOP ORDER IV AB PRO: HCPCS

## 2025-06-30 PROCEDURE — 66984 XCAPSL CTRC RMVL W/O ECP: CPT | Mod: RT

## 2025-06-30 DEVICE — TECNIS 1-PC CLEAR MONO 6.0MM 22.5D
Type: IMPLANTABLE DEVICE | Site: EYE | Status: FUNCTIONAL
Brand: TECNIS IOL

## 2025-06-30 RX ORDER — LABETALOL 20 MG/4 ML (5 MG/ML) INTRAVENOUS SYRINGE
PRN
Status: DISCONTINUED | OUTPATIENT
Start: 2025-06-30 | End: 2025-06-30

## 2025-06-30 RX ORDER — LIDOCAINE 40 MG/G
CREAM TOPICAL
Status: DISCONTINUED | OUTPATIENT
Start: 2025-06-30 | End: 2025-07-01 | Stop reason: HOSPADM

## 2025-06-30 RX ORDER — MOXIFLOXACIN IN NACL,ISO-OS/PF 0.3MG/0.3
SYRINGE (ML) INTRAOCULAR PRN
Status: DISCONTINUED | OUTPATIENT
Start: 2025-06-30 | End: 2025-06-30 | Stop reason: HOSPADM

## 2025-06-30 RX ORDER — POVIDONE-IODINE 5 %
SOLUTION, NON-ORAL OPHTHALMIC (EYE) PRN
Status: DISCONTINUED | OUTPATIENT
Start: 2025-06-30 | End: 2025-06-30 | Stop reason: HOSPADM

## 2025-06-30 RX ORDER — NALOXONE HYDROCHLORIDE 0.4 MG/ML
0.1 INJECTION, SOLUTION INTRAMUSCULAR; INTRAVENOUS; SUBCUTANEOUS
Status: DISCONTINUED | OUTPATIENT
Start: 2025-06-30 | End: 2025-07-01 | Stop reason: HOSPADM

## 2025-06-30 RX ORDER — SODIUM CHLORIDE, SODIUM LACTATE, POTASSIUM CHLORIDE, CALCIUM CHLORIDE 600; 310; 30; 20 MG/100ML; MG/100ML; MG/100ML; MG/100ML
INJECTION, SOLUTION INTRAVENOUS CONTINUOUS
Status: DISCONTINUED | OUTPATIENT
Start: 2025-06-30 | End: 2025-07-01 | Stop reason: HOSPADM

## 2025-06-30 RX ORDER — TETRACAINE HYDROCHLORIDE 5 MG/ML
SOLUTION OPHTHALMIC PRN
Status: DISCONTINUED | OUTPATIENT
Start: 2025-06-30 | End: 2025-06-30 | Stop reason: HOSPADM

## 2025-06-30 RX ORDER — CYCLOPENTOLAT/TROPIC/PHENYLEPH 1%-1%-2.5%
1 DROPS (EA) OPHTHALMIC (EYE)
Status: COMPLETED | OUTPATIENT
Start: 2025-06-30 | End: 2025-06-30

## 2025-06-30 RX ORDER — DEXAMETHASONE SODIUM PHOSPHATE 4 MG/ML
4 INJECTION, SOLUTION INTRA-ARTICULAR; INTRALESIONAL; INTRAMUSCULAR; INTRAVENOUS; SOFT TISSUE
Status: DISCONTINUED | OUTPATIENT
Start: 2025-06-30 | End: 2025-07-01 | Stop reason: HOSPADM

## 2025-06-30 RX ORDER — OXYCODONE HYDROCHLORIDE 5 MG/1
5 TABLET ORAL
Status: DISCONTINUED | OUTPATIENT
Start: 2025-06-30 | End: 2025-07-01 | Stop reason: HOSPADM

## 2025-06-30 RX ORDER — ONDANSETRON 4 MG/1
4 TABLET, ORALLY DISINTEGRATING ORAL EVERY 30 MIN PRN
Status: DISCONTINUED | OUTPATIENT
Start: 2025-06-30 | End: 2025-07-01 | Stop reason: HOSPADM

## 2025-06-30 RX ORDER — OXYCODONE HYDROCHLORIDE 5 MG/1
10 TABLET ORAL
Status: DISCONTINUED | OUTPATIENT
Start: 2025-06-30 | End: 2025-07-01 | Stop reason: HOSPADM

## 2025-06-30 RX ORDER — FENTANYL CITRATE 50 UG/ML
INJECTION, SOLUTION INTRAMUSCULAR; INTRAVENOUS PRN
Status: DISCONTINUED | OUTPATIENT
Start: 2025-06-30 | End: 2025-06-30

## 2025-06-30 RX ORDER — PROPARACAINE HYDROCHLORIDE 5 MG/ML
1 SOLUTION/ DROPS OPHTHALMIC ONCE
Status: COMPLETED | OUTPATIENT
Start: 2025-06-30 | End: 2025-06-30

## 2025-06-30 RX ORDER — SODIUM CHLORIDE, SODIUM LACTATE, POTASSIUM CHLORIDE, CALCIUM CHLORIDE 600; 310; 30; 20 MG/100ML; MG/100ML; MG/100ML; MG/100ML
INJECTION, SOLUTION INTRAVENOUS CONTINUOUS PRN
Status: DISCONTINUED | OUTPATIENT
Start: 2025-06-30 | End: 2025-06-30

## 2025-06-30 RX ORDER — ONDANSETRON 2 MG/ML
4 INJECTION INTRAMUSCULAR; INTRAVENOUS EVERY 30 MIN PRN
Status: DISCONTINUED | OUTPATIENT
Start: 2025-06-30 | End: 2025-07-01 | Stop reason: HOSPADM

## 2025-06-30 RX ORDER — ACETAMINOPHEN 325 MG/1
975 TABLET ORAL ONCE
Status: COMPLETED | OUTPATIENT
Start: 2025-06-30 | End: 2025-06-30

## 2025-06-30 RX ADMIN — FENTANYL CITRATE 25 MCG: 50 INJECTION, SOLUTION INTRAMUSCULAR; INTRAVENOUS at 09:13

## 2025-06-30 RX ADMIN — FENTANYL CITRATE 25 MCG: 50 INJECTION, SOLUTION INTRAMUSCULAR; INTRAVENOUS at 09:00

## 2025-06-30 RX ADMIN — Medication 1 DROP: at 08:45

## 2025-06-30 RX ADMIN — SODIUM CHLORIDE, SODIUM LACTATE, POTASSIUM CHLORIDE, CALCIUM CHLORIDE: 600; 310; 30; 20 INJECTION, SOLUTION INTRAVENOUS at 08:49

## 2025-06-30 RX ADMIN — LABETALOL 20 MG/4 ML (5 MG/ML) INTRAVENOUS SYRINGE 5 MG: at 09:33

## 2025-06-30 RX ADMIN — Medication 1 DROP: at 08:40

## 2025-06-30 RX ADMIN — ACETAMINOPHEN 975 MG: 325 TABLET ORAL at 08:34

## 2025-06-30 RX ADMIN — PROPARACAINE HYDROCHLORIDE 1 DROP: 5 SOLUTION/ DROPS OPHTHALMIC at 08:34

## 2025-06-30 RX ADMIN — Medication 1 DROP: at 08:34

## 2025-06-30 RX ADMIN — FENTANYL CITRATE 25 MCG: 50 INJECTION, SOLUTION INTRAMUSCULAR; INTRAVENOUS at 09:22

## 2025-06-30 RX ADMIN — FENTANYL CITRATE 25 MCG: 50 INJECTION, SOLUTION INTRAMUSCULAR; INTRAVENOUS at 08:56

## 2025-06-30 NOTE — DISCHARGE INSTRUCTIONS
CATARACT SURGERY POST-OP INSTRUCTIONS  Dr. Karlos Cantu  [phone]      Continue using CatarActive3 four times a day in the operative eye.  You should get 3 drops in today and 4 drops daily starting tomorrow. Space them out about every 4 hours (or with meals and at bedtime).     Keep the eye shield taped in place unless putting drops in. We will remove it for you in the office tomorrow.    Light sensitivity may be noticed. Sunglasses may be worn for comfort.    Do not rub the operated eye.    Keep the operated eye dry. You may wash your hair, bathe or shower, but keep the operated eye closed while doing so.     No swimming, hot tub, or sauna for 2 weeks.    No make up around eye for 5 days.    No bending at the waist or lifting more than 10 pounds for one week.    May take Tylenol (per directions on bottle) for mild pain. Last Tylenol dose 0834, next dose can be taken after 2:30 pm if needed.    Call the office at [phone] and ask to speak to the on-call ophthalmologist  if any of the following should occur:  Any sudden vision changes  Nausea or severe headache  Increase in pain not controlled  Or signs of infection (pus, increasing redness or tenderness)

## 2025-06-30 NOTE — ANESTHESIA POSTPROCEDURE EVALUATION
Patient: Duane A Otte    Procedure: Procedure(s):  RIGHT PHACOEMULSIFICATION, CATARACT, WITH INTRAOCULAR LENS IMPLANT       Anesthesia Type:  MAC    Note:  Disposition: Outpatient   Postop Pain Control: Uneventful            Sign Out: Well controlled pain   PONV: No   Neuro/Psych: Uneventful            Sign Out: Acceptable/Baseline neuro status   Airway/Respiratory: Uneventful            Sign Out: Acceptable/Baseline resp. status   CV/Hemodynamics: Uneventful            Sign Out: Acceptable CV status; No obvious hypovolemia; No obvious fluid overload   Other NRE: NONE   DID A NON-ROUTINE EVENT OCCUR? No           Last vitals:  Vitals Value Taken Time   /65 06/30/25 10:08   Temp 97.1  F (36.2  C) 06/30/25 09:35   Pulse 59 06/30/25 09:58   Resp 16 06/30/25 10:08   SpO2 97 % 06/30/25 09:58       Electronically Signed By: Aldair Dominguez MD  June 30, 2025  10:46 AM

## 2025-06-30 NOTE — ANESTHESIA CARE TRANSFER NOTE
Patient: Duane A Otte    Procedure: Procedure(s):  RIGHT PHACOEMULSIFICATION, CATARACT, WITH INTRAOCULAR LENS IMPLANT       Diagnosis: Combined form of age-related cataract, right eye [H25.811]  Diagnosis Additional Information: No value filed.    Anesthesia Type:   MAC     Note:      Level of Consciousness: awake  Oxygen Supplementation: room air    Independent Airway: airway patency satisfactory and stable  Dentition: dentition unchanged  Vital Signs Stable: post-procedure vital signs reviewed and stable  Report to RN Given: handoff report given  Patient transferred to: Phase II    Handoff Report: Identifed the Patient, Identified the Reponsible Provider, Reviewed the pertinent medical history, Discussed the surgical course, Reviewed Intra-OP anesthesia mangement and issues during anesthesia, Set expectations for post-procedure period and Allowed opportunity for questions and acknowledgement of understanding      Vitals:  Vitals Value Taken Time   /98    Temp 97.2    Pulse 66    Resp 14    SpO2 100        Electronically Signed By: ANDRES Hennessy CRNA  June 30, 2025  9:17 AM

## 2025-06-30 NOTE — OP NOTE
PreOp Diagnosis: Visually significant nuclear sclerotic cataract right eye  PostOp Diagnosis: Same  Surgeon: Karlos Cantu MD  Implant: Tecnis  [id number] +22.5D    Procedures:   1. Review of intraocular lens calculations, both eyes   2. Phacoemulsification and extraction of lens  right eye   3. Intraocular lens implantation right eye  Anesthesia: MAC/topical  Complications: None  EBL: <1cc    Duane A Otte suffers from a visually significant cataract of the right eye. This has caused problems with distance and reading vision, including glare. After discussing the risks, benefits, and alternatives, the patient wishes to proceed with cataract surgery.    The patient was identified in the pre-op area where the right eye was marked. The patient was then brought to the operating room where a time out was called, identifying the patient, the procedure, and the correct site. Tetracaine drops were applied to the operative eye. The operative eye was then prepped and draped in the usual sterile ophthalmic fashion. An eyelid speculum was placed into the operative eye. Additional tetracaine drops were applied. A paracentesis was made superior with a side port blade. 1% preservative-free lidocaine and epinephrine was injected into the anterior chamber. Due to obscured red reflex, trypan blue was irrigated into the anterior chamber to enhance capsular visualization.  This was irrigated from the anterior chamber after 60 seconds with balanced salt solution.   Endocoat was injected to deepen the anterior chamber. A 2.4mm clear corneal wound was created with a keratome blade temporally. A continuous curvilinear capsulorrhexis was started with a bent cystitome and completed with Utrada forceps. Hydrodissection of the lens nucleus was performed with BSS on a cannula. The lens nucleus was rotated. Phacoemulsification of the lens nucleus was accomplished in a phacoemulsification stop and chop technique. Remaining cortex was removed with  irrigation and aspiration. The lens capsule was noted to be intact. Healon was used to inflate the capsular bag.  The lens was injected into the capsular bag. Remaining viscoelastic was removed with irrigation and aspiration. The wounds were checked and found to be watertight after hydration.  Intracameral moxifloxacin was injected into the anterior chamber. The eyelid speculum was removed. The patient tolerated the procedure well and was in stable condition on the way to the recovery area.     Karlos Cantu MD

## 2025-06-30 NOTE — ANESTHESIA PREPROCEDURE EVALUATION
Anesthesia Pre-Procedure Evaluation    Patient: Duane A Otte   MRN: [id number] : 1940          Procedure : Procedure(s):  RIGHT PHACOEMULSIFICATION, CATARACT, WITH INTRAOCULAR LENS IMPLANT         Past Medical History:   Diagnosis Date    Adenomatous polyp of colon 2012    5 year f/u planned    Anxiety     Chronic pain     DDD (degenerative disc disease), lumbar     Diverticulosis of colon     ED (erectile dysfunction)     HTN (hypertension)     Macular degeneration     Neuropathy     Nonsenile cataract     Onychomycosis     Osteopenia     Prediabetes 2013    A1C=6.0    Prostatitis     PUD (peptic ulcer disease) 1965    Stenosis, spinal       Past Surgical History:   Procedure Laterality Date    CARPAL TUNNEL RELEASE RT/LT  2012    LT    COLONOSCOPY  2012    Procedure: COLONOSCOPY;  COLONOSCOPY, RECTAL BLEEDING (RED);  Surgeon: Rex Humphreys MD;  Location: [facility]    CYSTOURETHROSCOPY  ;  -    HEMORRHOIDECTOMY      HERNIA REPAIR, INGUINAL RT/LT  ;  1985    X2  Bilateral    LAMINECTOMY LUMBAR ONE LEVEL  2004    ZZC LAMINECTOMY,>2 SGMT,LUMBAR      ZZC LUMBAR SPINE FUSION,ANTER APPRCH  2005      Allergies   Allergen Reactions    Duloxetine Hcl Other (See Comments)    Duloxetine Other (See Comments)     insomnia      Social History     Tobacco Use    Smoking status: Never    Smokeless tobacco: Never    Tobacco comments:     no second smoke   Substance Use Topics    Alcohol use: No     Alcohol/week: 0.0 standard drinks of alcohol      Wt Readings from Last 1 Encounters:   13 76.7 kg (169 lb)        Anesthesia Evaluation   Pt has had prior anesthetic.     No history of anesthetic complications       ROS/MED HX  ENT/Pulmonary:       Neurologic:       Cardiovascular:     (+)  hypertension- -   -  - -                                      METS/Exercise Tolerance:     Hematologic:       Musculoskeletal:       GI/Hepatic:       Renal/Genitourinary:       Endo:      "  Psychiatric/Substance Use:     (+) psychiatric history anxiety       Infectious Disease:       Malignancy:       Other:              Physical Exam  Airway  Mallampati: II  TM distance: >3 FB  Neck ROM: full  Mouth opening: unable to assess    Cardiovascular - normal exam   Dental   (+) Modest Abnormalities - crowns, retainers, 1 or 2 missing teeth      Pulmonary - normal exam      Neurological - normal exam  He appears awake, alert and oriented x3.    Other Findings       OUTSIDE LABS:  CBC:   Lab Results   Component Value Date    WBC 4.7 01/15/2013    WBC 5.3 04/18/2012    HGB 13.3 01/15/2013    HGB 13.6 04/18/2012    HCT 39.5 01/15/2013    HCT 39.3 (L) 04/18/2012     01/15/2013     04/18/2012     BMP:   Lab Results   Component Value Date     05/22/2013     01/15/2013    POTASSIUM 3.6 05/22/2013    POTASSIUM 3.7 04/11/2012    CHLORIDE 98 05/22/2013    CHLORIDE 98 04/11/2012    CO2 30 05/22/2013    CO2 32 04/11/2012    BUN 23 05/22/2013    BUN 21 04/11/2012    CR 0.79 05/22/2013    CR 0.94 04/11/2012     (H) 05/22/2013     (H) 04/11/2012     COAGS: No results found for: \"PTT\", \"INR\", \"FIBR\"  POC: No results found for: \"BGM\", \"HCG\", \"HCGS\"  HEPATIC:   Lab Results   Component Value Date    ALBUMIN 4.0 04/11/2012    PROTTOTAL 6.9 04/11/2012    ALT 15 01/15/2013    AST 16 01/15/2013    ALKPHOS 80 04/11/2012    BILITOTAL 0.4 04/11/2012     OTHER:   Lab Results   Component Value Date    A1C 6.0 05/22/2013    MOHAN 8.8 05/22/2013    SED 6 04/18/2012       Anesthesia Plan    ASA Status:  2      NPO Status: NPO Appropriate   Anesthesia Type: MAC.  Airway: natural airway.  Induction: intravenous.  Maintenance: TIVA.   Techniques and Equipment:       - Monitoring Plan: standard ASA monitoring     Consents    Anesthesia Plan(s) and associated risks, benefits, and realistic alternatives discussed. Questions answered and patient/representative(s) expressed understanding.     - Discussed:    "  - Discussed with:  Patient               Postoperative Care         Comments:                   Aldair Dominguez MD    I have reviewed the pertinent notes and labs in the chart from the past 30 days and (re)examined the patient.  Any updates or changes from those notes are reflected in this note.    Clinically Significant Risk Factors Present on Admission                   # Hypertension: Noted on problem list

## 2025-07-01 ENCOUNTER — OFFICE VISIT (OUTPATIENT)
Dept: OPHTHALMOLOGY | Facility: CLINIC | Age: 85
End: 2025-07-01
Payer: COMMERCIAL

## 2025-07-01 DIAGNOSIS — Z96.1 PSEUDOPHAKIA: Primary | ICD-10-CM

## 2025-07-01 DIAGNOSIS — H35.3231 EXUDATIVE AGE-RELATED MACULAR DEGENERATION OF BOTH EYES WITH ACTIVE CHOROIDAL NEOVASCULARIZATION (H): ICD-10-CM

## 2025-07-01 PROCEDURE — 99024 POSTOP FOLLOW-UP VISIT: CPT | Performed by: STUDENT IN AN ORGANIZED HEALTH CARE EDUCATION/TRAINING PROGRAM

## 2025-07-01 ASSESSMENT — VISUAL ACUITY
METHOD: SNELLEN - LINEAR
OD_SC: 20/400

## 2025-07-01 ASSESSMENT — TONOMETRY
OD_IOP_MMHG: 19
IOP_METHOD: APPLANATION

## 2025-07-01 ASSESSMENT — EXTERNAL EXAM - RIGHT EYE: OD_EXAM: PROLAPSED FAT PADS: UPPER, LOWER

## 2025-07-01 ASSESSMENT — EXTERNAL EXAM - LEFT EYE: OS_EXAM: PROLAPSED FAT PADS: UPPER, LOWER

## 2025-07-01 NOTE — PATIENT INSTRUCTIONS
POST-OP CATARACT INSTRUCTIONS    *   Use the following drop(s) in the RIGHT EYE four times a day until the bottle(s) run out:        CatarActive 3 (dark blue top)    *   Wear eye shield over the RIGHT EYE when sleeping for one week (until Monday, July 7th). Do not rub the operated eye.     *   No bending or lifting more than 10 pounds for one week (until Monday, July 7th).    *   Keep water out of eye for two weeks.    *   OK to resume aspirin and/or other blood thinners if you stopped.     *   If your vision worsens, eye becomes increasingly red, or becomes painful, call [phone].     Karlos Cantu M.D.

## 2025-07-01 NOTE — LETTER
7/1/2025      Duane A Otte  [id number] [facility]  [place] [address]      Dear Colleague,    Thank you for referring your patient, Duane A Otte, to the [facility]. Please see a copy of my visit note below.     Current Eye Medications:  CatarActive 3 right eye four times a day.      Subjective:  Status/Post Cataract extraction, with Intraocular lens implant, right eye:  6-30-25.  Slept well.  Right eye is comfortable.      Objective:  See Ophthalmology Exam.      Assessment:  Duane A Otte is a 84 year old male who presents with:   Encounter Diagnoses   Name Primary?     Pseudophakia - Right Eye POD1 s/p CE/IOL right eye - doing well.      Exudative age-related macular degeneration of both eyes with active choroidal neovascularization (H)        Plan:  POST-OP CATARACT INSTRUCTIONS    *   Use the following drop(s) in the RIGHT EYE four times a day until the bottle(s) run out:        CatarActive 3 (dark blue top)    *   Wear eye shield over the RIGHT EYE when sleeping for one week (until Monday, July 7th). Do not rub the operated eye.     *   No bending or lifting more than 10 pounds for one week (until Monday, July 7th).    *   Keep water out of eye for two weeks.    *   OK to resume aspirin and/or other blood thinners if you stopped.     *   If your vision worsens, eye becomes increasingly red, or becomes painful, call [phone].     Karlos Cantu M.D.            Again, thank you for allowing me to participate in the care of your patient.        Sincerely,        Karlos Cantu MD    Electronically signed

## 2025-07-01 NOTE — PROGRESS NOTES
Current Eye Medications:  CatarActive 3 right eye four times a day.      Subjective:  Status/Post Cataract extraction, with Intraocular lens implant, right eye:  6-30-25.  Slept well.  Right eye is comfortable.      Objective:  See Ophthalmology Exam.      Assessment:  Duane A Otte is a 84 year old male who presents with:   Encounter Diagnoses   Name Primary?    Pseudophakia - Right Eye POD1 s/p CE/IOL right eye - doing well.     Exudative age-related macular degeneration of both eyes with active choroidal neovascularization (H)        Plan:  POST-OP CATARACT INSTRUCTIONS    *   Use the following drop(s) in the RIGHT EYE four times a day until the bottle(s) run out:        CatarActive 3 (dark blue top)    *   Wear eye shield over the RIGHT EYE when sleeping for one week (until Monday, July 7th). Do not rub the operated eye.     *   No bending or lifting more than 10 pounds for one week (until Monday, July 7th).    *   Keep water out of eye for two weeks.    *   OK to resume aspirin and/or other blood thinners if you stopped.     *   If your vision worsens, eye becomes increasingly red, or becomes painful, call [phone].     Karlos Cantu M.D.

## 2025-07-13 ENCOUNTER — HEALTH MAINTENANCE LETTER (OUTPATIENT)
Age: 85
End: 2025-07-13

## 2025-07-21 ENCOUNTER — TELEPHONE (OUTPATIENT)
Dept: OPHTHALMOLOGY | Facility: CLINIC | Age: 85
End: 2025-07-21
Payer: COMMERCIAL

## 2025-07-21 NOTE — TELEPHONE ENCOUNTER
Left a voice mail for the pt and his wife. Let them know I cancelled the post op visit for tomorrow. I also let them know that I will call back sometime this week to reschedule surgery after I have had a chance to talk to Dr. Cantu.     Debbie Fisher

## 2025-07-21 NOTE — TELEPHONE ENCOUNTER
[facility] Call Center    Phone Message    May a detailed message be left on voicemail: yes     Reason for Call: Other: Patient's spouse called to rescheduled the patient's surgery with Dr. Cantu and to cancel the post opp that is on 7/22/25. Message also left on  surgery scheduling VM. Thank you.    Action Taken: Other:  Ophthalmology    Travel Screening: Not Applicable     Date of Service:

## 2025-07-29 ENCOUNTER — PREP FOR PROCEDURE (OUTPATIENT)
Dept: OPHTHALMOLOGY | Facility: CLINIC | Age: 85
End: 2025-07-29
Payer: COMMERCIAL

## 2025-07-29 ENCOUNTER — TELEPHONE (OUTPATIENT)
Dept: OPHTHALMOLOGY | Facility: CLINIC | Age: 85
End: 2025-07-29
Payer: COMMERCIAL

## 2025-07-29 DIAGNOSIS — H25.812 COMBINED FORM OF AGE-RELATED CATARACT, LEFT EYE: Primary | ICD-10-CM

## 2025-08-11 ENCOUNTER — TRANSFERRED RECORDS (OUTPATIENT)
Dept: HEALTH INFORMATION MANAGEMENT | Facility: CLINIC | Age: 85
End: 2025-08-11
Payer: COMMERCIAL

## 2025-08-15 ENCOUNTER — ANESTHESIA EVENT (OUTPATIENT)
Dept: SURGERY | Facility: AMBULATORY SURGERY CENTER | Age: 85
End: 2025-08-15
Payer: COMMERCIAL

## 2025-08-18 ENCOUNTER — ANESTHESIA (OUTPATIENT)
Dept: SURGERY | Facility: AMBULATORY SURGERY CENTER | Age: 85
End: 2025-08-18
Payer: COMMERCIAL

## 2025-08-18 ENCOUNTER — HOSPITAL ENCOUNTER (OUTPATIENT)
Facility: AMBULATORY SURGERY CENTER | Age: 85
Discharge: HOME | End: 2025-08-18
Attending: STUDENT IN AN ORGANIZED HEALTH CARE EDUCATION/TRAINING PROGRAM | Admitting: STUDENT IN AN ORGANIZED HEALTH CARE EDUCATION/TRAINING PROGRAM
Payer: COMMERCIAL

## 2025-08-18 VITALS
HEIGHT: 70 IN | RESPIRATION RATE: 16 BRPM | DIASTOLIC BLOOD PRESSURE: 62 MMHG | SYSTOLIC BLOOD PRESSURE: 125 MMHG | TEMPERATURE: 98.4 F | BODY MASS INDEX: 21.33 KG/M2 | WEIGHT: 149 LBS | OXYGEN SATURATION: 98 %

## 2025-08-18 DIAGNOSIS — H25.812 COMBINED FORM OF AGE-RELATED CATARACT, LEFT EYE: ICD-10-CM

## 2025-08-18 PROCEDURE — 66984 XCAPSL CTRC RMVL W/O ECP: CPT | Mod: LT

## 2025-08-18 PROCEDURE — G8907 PT DOC NO EVENTS ON DISCHARG: HCPCS

## 2025-08-18 PROCEDURE — 66984 XCAPSL CTRC RMVL W/O ECP: CPT | Mod: 79 | Performed by: STUDENT IN AN ORGANIZED HEALTH CARE EDUCATION/TRAINING PROGRAM

## 2025-08-18 PROCEDURE — G8918 PT W/O PREOP ORDER IV AB PRO: HCPCS

## 2025-08-18 DEVICE — EYE IMP IOL AMO PCL TECNIS ZCB00 22.0: Type: IMPLANTABLE DEVICE | Site: EYE | Status: FUNCTIONAL

## 2025-08-18 RX ORDER — ACETAMINOPHEN 325 MG/1
975 TABLET ORAL ONCE
Status: COMPLETED | OUTPATIENT
Start: 2025-08-18 | End: 2025-08-18

## 2025-08-18 RX ORDER — ONDANSETRON 4 MG/1
4 TABLET, ORALLY DISINTEGRATING ORAL EVERY 30 MIN PRN
Status: DISCONTINUED | OUTPATIENT
Start: 2025-08-18 | End: 2025-08-19 | Stop reason: HOSPADM

## 2025-08-18 RX ORDER — NALOXONE HYDROCHLORIDE 0.4 MG/ML
0.1 INJECTION, SOLUTION INTRAMUSCULAR; INTRAVENOUS; SUBCUTANEOUS
Status: DISCONTINUED | OUTPATIENT
Start: 2025-08-18 | End: 2025-08-19 | Stop reason: HOSPADM

## 2025-08-18 RX ORDER — ONDANSETRON 2 MG/ML
4 INJECTION INTRAMUSCULAR; INTRAVENOUS EVERY 30 MIN PRN
Status: DISCONTINUED | OUTPATIENT
Start: 2025-08-18 | End: 2025-08-19 | Stop reason: HOSPADM

## 2025-08-18 RX ORDER — TRAZODONE HYDROCHLORIDE 50 MG/1
50 TABLET ORAL AT BEDTIME
COMMUNITY

## 2025-08-18 RX ORDER — PROPARACAINE HYDROCHLORIDE 5 MG/ML
1 SOLUTION/ DROPS OPHTHALMIC ONCE
Status: COMPLETED | OUTPATIENT
Start: 2025-08-18 | End: 2025-08-18

## 2025-08-18 RX ORDER — POVIDONE-IODINE 5 %
SOLUTION, NON-ORAL OPHTHALMIC (EYE) PRN
Status: DISCONTINUED | OUTPATIENT
Start: 2025-08-18 | End: 2025-08-18 | Stop reason: HOSPADM

## 2025-08-18 RX ORDER — MOXIFLOXACIN IN NACL,ISO-OS/PF 0.3MG/0.3
SYRINGE (ML) INTRAOCULAR PRN
Status: DISCONTINUED | OUTPATIENT
Start: 2025-08-18 | End: 2025-08-18 | Stop reason: HOSPADM

## 2025-08-18 RX ORDER — CYCLOPENTOLAT/TROPIC/PHENYLEPH 1%-1%-2.5%
1 DROPS (EA) OPHTHALMIC (EYE)
Status: COMPLETED | OUTPATIENT
Start: 2025-08-18 | End: 2025-08-18

## 2025-08-18 RX ORDER — LIDOCAINE 40 MG/G
CREAM TOPICAL
Status: DISCONTINUED | OUTPATIENT
Start: 2025-08-18 | End: 2025-08-19 | Stop reason: HOSPADM

## 2025-08-18 RX ORDER — FENTANYL CITRATE 50 UG/ML
INJECTION, SOLUTION INTRAMUSCULAR; INTRAVENOUS PRN
Status: DISCONTINUED | OUTPATIENT
Start: 2025-08-18 | End: 2025-08-18

## 2025-08-18 RX ORDER — OXYCODONE HYDROCHLORIDE 5 MG/1
10 TABLET ORAL
Status: DISCONTINUED | OUTPATIENT
Start: 2025-08-18 | End: 2025-08-19 | Stop reason: HOSPADM

## 2025-08-18 RX ORDER — TETRACAINE HYDROCHLORIDE 5 MG/ML
SOLUTION OPHTHALMIC PRN
Status: DISCONTINUED | OUTPATIENT
Start: 2025-08-18 | End: 2025-08-18 | Stop reason: HOSPADM

## 2025-08-18 RX ORDER — DONEPEZIL HYDROCHLORIDE 5 MG/1
5 TABLET, FILM COATED ORAL AT BEDTIME
COMMUNITY

## 2025-08-18 RX ORDER — SODIUM CHLORIDE, SODIUM LACTATE, POTASSIUM CHLORIDE, CALCIUM CHLORIDE 600; 310; 30; 20 MG/100ML; MG/100ML; MG/100ML; MG/100ML
INJECTION, SOLUTION INTRAVENOUS CONTINUOUS
Status: DISCONTINUED | OUTPATIENT
Start: 2025-08-18 | End: 2025-08-19 | Stop reason: HOSPADM

## 2025-08-18 RX ORDER — DEXAMETHASONE SODIUM PHOSPHATE 4 MG/ML
4 INJECTION, SOLUTION INTRA-ARTICULAR; INTRALESIONAL; INTRAMUSCULAR; INTRAVENOUS; SOFT TISSUE
Status: DISCONTINUED | OUTPATIENT
Start: 2025-08-18 | End: 2025-08-19 | Stop reason: HOSPADM

## 2025-08-18 RX ORDER — OXYCODONE HYDROCHLORIDE 5 MG/1
5 TABLET ORAL
Status: DISCONTINUED | OUTPATIENT
Start: 2025-08-18 | End: 2025-08-19 | Stop reason: HOSPADM

## 2025-08-18 RX ADMIN — FENTANYL CITRATE 25 MCG: 50 INJECTION, SOLUTION INTRAMUSCULAR; INTRAVENOUS at 09:17

## 2025-08-18 RX ADMIN — SODIUM CHLORIDE, SODIUM LACTATE, POTASSIUM CHLORIDE, CALCIUM CHLORIDE: 600; 310; 30; 20 INJECTION, SOLUTION INTRAVENOUS at 09:04

## 2025-08-18 RX ADMIN — ACETAMINOPHEN 975 MG: 325 TABLET ORAL at 08:28

## 2025-08-18 RX ADMIN — Medication 1 DROP: at 09:05

## 2025-08-18 RX ADMIN — Medication 1 DROP: at 08:46

## 2025-08-18 RX ADMIN — Medication 1 DROP: at 08:57

## 2025-08-18 RX ADMIN — PROPARACAINE HYDROCHLORIDE 1 DROP: 5 SOLUTION/ DROPS OPHTHALMIC at 08:46

## 2025-08-18 RX ADMIN — FENTANYL CITRATE 25 MCG: 50 INJECTION, SOLUTION INTRAMUSCULAR; INTRAVENOUS at 09:22

## 2025-08-19 ENCOUNTER — OFFICE VISIT (OUTPATIENT)
Dept: OPHTHALMOLOGY | Facility: CLINIC | Age: 85
End: 2025-08-19
Payer: COMMERCIAL

## 2025-08-19 DIAGNOSIS — Z96.1 PSEUDOPHAKIA: Primary | ICD-10-CM

## 2025-08-19 PROCEDURE — 99024 POSTOP FOLLOW-UP VISIT: CPT | Performed by: STUDENT IN AN ORGANIZED HEALTH CARE EDUCATION/TRAINING PROGRAM

## 2025-08-19 ASSESSMENT — TONOMETRY
IOP_METHOD: APPLANATION
OS_IOP_MMHG: 31

## 2025-08-19 ASSESSMENT — VISUAL ACUITY
OS_SC: 20/400
METHOD: SNELLEN - LINEAR

## 2025-08-19 ASSESSMENT — EXTERNAL EXAM - LEFT EYE: OS_EXAM: PROLAPSED FAT PADS: UPPER, LOWER

## 2025-08-19 ASSESSMENT — EXTERNAL EXAM - RIGHT EYE: OD_EXAM: PROLAPSED FAT PADS: UPPER, LOWER

## 2025-09-03 ENCOUNTER — OFFICE VISIT (OUTPATIENT)
Dept: OPHTHALMOLOGY | Facility: CLINIC | Age: 85
End: 2025-09-03
Payer: COMMERCIAL

## 2025-09-03 DIAGNOSIS — H43.813 POSTERIOR VITREOUS DETACHMENT, BILATERAL: ICD-10-CM

## 2025-09-03 DIAGNOSIS — Z96.1 PSEUDOPHAKIA: Primary | ICD-10-CM

## 2025-09-03 DIAGNOSIS — H02.403 PTOSIS, BILATERAL: ICD-10-CM

## 2025-09-03 DIAGNOSIS — D31.32 NEVUS, CHOROIDAL, LEFT: ICD-10-CM

## 2025-09-03 DIAGNOSIS — H35.3231 EXUDATIVE AGE-RELATED MACULAR DEGENERATION OF BOTH EYES WITH ACTIVE CHOROIDAL NEOVASCULARIZATION (H): ICD-10-CM

## 2025-09-03 PROCEDURE — 99024 POSTOP FOLLOW-UP VISIT: CPT | Performed by: STUDENT IN AN ORGANIZED HEALTH CARE EDUCATION/TRAINING PROGRAM

## 2025-09-03 ASSESSMENT — VISUAL ACUITY
OS_PH_SC+: -1
OD_SC: 20/200
OS_SC+: -1
METHOD: SNELLEN - LINEAR
OD_PH_SC: 20/100
OS_PH_SC: 20/50
OS_SC: 20/80

## 2025-09-03 ASSESSMENT — TONOMETRY
OS_IOP_MMHG: 13
IOP_METHOD: APPLANATION
OD_IOP_MMHG: 12

## 2025-09-03 ASSESSMENT — REFRACTION_MANIFEST
OS_AXIS: 015
OD_AXIS: 165
OS_SPHERE: -2.25
OD_CYLINDER: +1.25
OS_CYLINDER: +1.00
OS_ADD: +2.50
OD_SPHERE: -1.75
OD_ADD: +2.50

## 2025-09-03 ASSESSMENT — EXTERNAL EXAM - RIGHT EYE: OD_EXAM: PROLAPSED FAT PADS: UPPER, LOWER

## 2025-09-03 ASSESSMENT — CUP TO DISC RATIO
OS_RATIO: 0.2
OD_RATIO: 0.2

## 2025-09-03 ASSESSMENT — EXTERNAL EXAM - LEFT EYE: OS_EXAM: PROLAPSED FAT PADS: UPPER, LOWER

## (undated) DEVICE — EYE PACK CUSTOM CATARACT AS12127-01

## (undated) RX ORDER — ACETAMINOPHEN 325 MG/1
TABLET ORAL
Status: DISPENSED
Start: 2025-08-18

## (undated) RX ORDER — ACETAMINOPHEN 325 MG/1
TABLET ORAL
Status: DISPENSED
Start: 2025-06-30

## (undated) RX ORDER — FENTANYL CITRATE 50 UG/ML
INJECTION, SOLUTION INTRAMUSCULAR; INTRAVENOUS
Status: DISPENSED
Start: 2025-06-30

## (undated) RX ORDER — LABETALOL HYDROCHLORIDE 5 MG/ML
INJECTION, SOLUTION INTRAVENOUS
Status: DISPENSED
Start: 2025-06-30

## (undated) RX ORDER — OXYMETAZOLINE HYDROCHLORIDE 0.05 G/100ML
SPRAY NASAL
Status: DISPENSED
Start: 2025-08-18

## (undated) RX ORDER — FENTANYL CITRATE 50 UG/ML
INJECTION, SOLUTION INTRAMUSCULAR; INTRAVENOUS
Status: DISPENSED
Start: 2025-08-18